# Patient Record
Sex: FEMALE | Race: WHITE | NOT HISPANIC OR LATINO | Employment: OTHER | URBAN - METROPOLITAN AREA
[De-identification: names, ages, dates, MRNs, and addresses within clinical notes are randomized per-mention and may not be internally consistent; named-entity substitution may affect disease eponyms.]

---

## 2019-02-25 ENCOUNTER — OFFICE VISIT (OUTPATIENT)
Dept: ORTHOPEDICS | Facility: CLINIC | Age: 74
End: 2019-02-25
Payer: MEDICARE

## 2019-02-25 VITALS
HEIGHT: 59 IN | SYSTOLIC BLOOD PRESSURE: 130 MMHG | HEART RATE: 79 BPM | BODY MASS INDEX: 27.21 KG/M2 | WEIGHT: 135 LBS | DIASTOLIC BLOOD PRESSURE: 80 MMHG

## 2019-02-25 DIAGNOSIS — Z98.1 HISTORY OF FUSION OF CERVICAL SPINE: ICD-10-CM

## 2019-02-25 DIAGNOSIS — S16.1XXA ACUTE STRAIN OF NECK MUSCLE, INITIAL ENCOUNTER: ICD-10-CM

## 2019-02-25 DIAGNOSIS — M46.02 SPINAL ENTHESOPATHY OF CERVICAL REGION: ICD-10-CM

## 2019-02-25 DIAGNOSIS — M54.2 CERVICAL PAIN (NECK): Primary | ICD-10-CM

## 2019-02-25 PROCEDURE — 99213 PR OFFICE/OUTPT VISIT, EST, LEVL III, 20-29 MIN: ICD-10-PCS | Mod: 25,,, | Performed by: ORTHOPAEDIC SURGERY

## 2019-02-25 PROCEDURE — 72040 X-RAY EXAM NECK SPINE 2-3 VW: CPT | Mod: ,,, | Performed by: ORTHOPAEDIC SURGERY

## 2019-02-25 PROCEDURE — 20551 TENDON ORIGIN: ICD-10-PCS | Mod: 59,RT,, | Performed by: ORTHOPAEDIC SURGERY

## 2019-02-25 PROCEDURE — 20551 NJX 1 TENDON ORIGIN/INSJ: CPT | Mod: RT,,, | Performed by: ORTHOPAEDIC SURGERY

## 2019-02-25 PROCEDURE — 99213 OFFICE O/P EST LOW 20 MIN: CPT | Mod: 25,,, | Performed by: ORTHOPAEDIC SURGERY

## 2019-02-25 PROCEDURE — 72040: ICD-10-PCS | Mod: ,,, | Performed by: ORTHOPAEDIC SURGERY

## 2019-02-25 RX ORDER — HYDROCHLOROTHIAZIDE 12.5 MG/1
TABLET ORAL
COMMUNITY
End: 2019-02-25

## 2019-02-25 RX ORDER — LOSARTAN POTASSIUM 25 MG/1
TABLET ORAL
COMMUNITY
Start: 2019-01-30

## 2019-02-25 RX ORDER — SERTRALINE HYDROCHLORIDE 100 MG/1
TABLET, FILM COATED ORAL
COMMUNITY
Start: 2019-01-30 | End: 2020-10-12 | Stop reason: ALTCHOICE

## 2019-02-25 RX ORDER — TRAZODONE HYDROCHLORIDE 50 MG/1
TABLET ORAL
Status: ON HOLD | COMMUNITY
Start: 2019-01-30 | End: 2020-12-04

## 2019-02-25 RX ORDER — BLOOD-GLUCOSE METER
KIT MISCELLANEOUS
COMMUNITY
Start: 2018-11-27

## 2019-02-25 RX ORDER — OMEPRAZOLE 20 MG/1
CAPSULE, DELAYED RELEASE ORAL
COMMUNITY
Start: 2019-01-30 | End: 2021-07-30

## 2019-02-25 RX ORDER — MELOXICAM 15 MG/1
15 TABLET ORAL DAILY
Qty: 30 TABLET | Refills: 2 | Status: SHIPPED | OUTPATIENT
Start: 2019-02-25 | End: 2019-03-27

## 2019-02-25 RX ORDER — METHYLPREDNISOLONE ACETATE 40 MG/ML
40 INJECTION, SUSPENSION INTRA-ARTICULAR; INTRALESIONAL; INTRAMUSCULAR; SOFT TISSUE
Status: DISCONTINUED | OUTPATIENT
Start: 2019-02-25 | End: 2019-02-25 | Stop reason: HOSPADM

## 2019-02-25 RX ORDER — ATORVASTATIN CALCIUM 80 MG/1
TABLET, FILM COATED ORAL
COMMUNITY
Start: 2018-11-27

## 2019-02-25 RX ORDER — LANOLIN ALCOHOL/MO/W.PET/CERES
CREAM (GRAM) TOPICAL
COMMUNITY
Start: 2019-01-30

## 2019-02-25 RX ORDER — SITAGLIPTIN AND METFORMIN HYDROCHLORIDE 1000; 50 MG/1; MG/1
TABLET, FILM COATED, EXTENDED RELEASE ORAL
COMMUNITY
Start: 2019-01-30 | End: 2020-10-12 | Stop reason: ALTCHOICE

## 2019-02-25 RX ORDER — TIZANIDINE 4 MG/1
4 TABLET ORAL 3 TIMES DAILY
Qty: 90 TABLET | Refills: 1 | Status: SHIPPED | OUTPATIENT
Start: 2019-02-25 | End: 2019-03-27

## 2019-02-25 RX ORDER — CHOLECALCIFEROL (VITAMIN D3) 50 MCG
TABLET ORAL
COMMUNITY
Start: 2019-01-30

## 2019-02-25 RX ORDER — GLIPIZIDE 2.5 MG/1
TABLET, EXTENDED RELEASE ORAL
COMMUNITY
Start: 2019-01-30 | End: 2020-10-12 | Stop reason: ALTCHOICE

## 2019-02-25 RX ADMIN — METHYLPREDNISOLONE ACETATE 40 MG: 40 INJECTION, SUSPENSION INTRA-ARTICULAR; INTRALESIONAL; INTRAMUSCULAR; SOFT TISSUE at 11:02

## 2019-02-25 NOTE — PROCEDURES
[facility]  Date/Time: 2/25/2019 11:15 AM  Performed by: Wilver Diaz MD  Authorized by: Wilver Diaz MD     Consent Done?:  Yes (Verbal)  Timeout: prior to procedure the correct patient, procedure, and site was verified    Indications:  Pain  Timeout: prior to procedure the correct patient, procedure, and site was verified    Location: RT sided cervical.  Needle size:  25 G  Medications:  40 mg methylPREDNISolone acetate 40 mg/mL  Patient tolerance:  Patient tolerated the procedure well with no immediate complications  [facility]  Date/Time: 2/25/2019 11:16 AM  Performed by: Wilver Diaz MD  Authorized by: Wilver Diaz MD     Consent Done?:  Yes (Verbal)  Timeout: prior to procedure the correct patient, procedure, and site was verified    Indications:  Pain  Timeout: prior to procedure the correct patient, procedure, and site was verified    Location: RT sided scapular.  Needle size:  25 G  Medications:  40 mg methylPREDNISolone acetate 40 mg/mL  Patient tolerance:  Patient tolerated the procedure well with no immediate complications

## 2019-02-25 NOTE — PROGRESS NOTES
Subjective:       Patient ID: Madhuri Manuel is a 73 y.o. female.    Chief Complaint: Pain of the Neck (Neck pain x 2 months radiates to right shoulder and elbow. No injury)      History of Present Illness  Patient is here secondary to reoccurrence of right-sided neck pain. In radiates down her right upper extremity to about the elbow. She is status post a previous multilevel cervical fusion from C3-C7 which was done in general 2017. She did extremely well postoperatively until this recent onset of symptoms about 2 months ago. There was no true acute injury but there was an incidence where her head got pushed back somewhat; she feels like this was the onset of her symptoms.    Current Medications  Current Outpatient Medications   Medication Sig Dispense Refill    atorvastatin (LIPITOR) 80 MG tablet       CALCITRATE-VITAMIN D 315-250 mg-unit Tab       cholecalciferol, vitamin D3, (VITAMIN D3) 2,000 unit Tab       FREESTYLE LITE STRIPS Strp       glipiZIDE (GLUCOTROL) 2.5 MG TR24       JANUMET XR 50-1,000 mg TM24       losartan (COZAAR) 25 MG tablet       magnesium oxide (MAG-OX) 400 mg (241.3 mg magnesium) tablet       omeprazole (PRILOSEC) 20 MG capsule       sertraline (ZOLOFT) 100 MG tablet       traZODone (DESYREL) 50 MG tablet        No current facility-administered medications for this visit.        Allergies  Review of patient's allergies indicates:   Allergen Reactions    Penicillins Rash     started after taking 8-10 days of augmentin. She did have a reclast injection in the last 24 hours also but has never had problems with this.      Iodinated contrast- oral and iv dye     Latex Rash       Past Medical History  Past Medical History:   Diagnosis Date    Anemia     Diabetes mellitus, type 2     Hypertension     Osteoporosis        Surgical History  Past Surgical History:   Procedure Laterality Date    CERVICAL FUSION      gastric by pass      kidney stones         Family History:    History reviewed. No pertinent family history.    Social History:   Social History     Socioeconomic History    Marital status:      Spouse name: Not on file    Number of children: Not on file    Years of education: Not on file    Highest education level: Not on file   Social Needs    Financial resource strain: Not on file    Food insecurity - worry: Not on file    Food insecurity - inability: Not on file    Transportation needs - medical: Not on file    Transportation needs - non-medical: Not on file   Occupational History    Not on file   Tobacco Use    Smoking status: Never Smoker    Smokeless tobacco: Never Used   Substance and Sexual Activity    Alcohol use: Not on file    Drug use: Not on file    Sexual activity: Not on file   Other Topics Concern    Not on file   Social History Narrative    Not on file       Hospitalization/Major Diagnostic Procedure:     Review of Systems     General/Constitutional:  Chills denies. Fatigue denies. Fever denies. Weight gain denies. Weight loss denies.    Respiratory:  Shortness of breath denies.    Cardiovascular:  Chest pain denies.    Gastrointestinal:  Constipation denies. Diarrhea denies. Nausea denies. Vomiting denies.     Hematology:  Easy bruising denies. Prolonged bleeding denies.     Genitourinary:  Frequent urination denies. Pain in lower back denies. Painful urination denies.     Musculoskeletal:  See HPI for details    Skin:  Rash denies.    Neurologic:  Dizziness denies. Gait abnormalities denies. Seizures denies. Tingling/Numbess denies.    Psychiatric:  Anxiety denies. Depressed mood denies.     Objective:   Vital Signs:   Vitals:    02/25/19 1036   BP: 130/80   Pulse: 79        Physical Exam      General Examination:     Constitutional: The patient is alert and oriented to lace person and time. Mood is pleasant.     Head/Face: Normal facial features normal eyebrows    Eyes: Normal extraocular motion bilaterally    Lungs: Respirations  "are equal and unlabored    Gait is coordinated.    Cardiovascular: There are no swelling or varicosities present.    Lymphatic: Negative for adenopathy    Skin: Normal    Neurological: Level of consciousness normal. Oriented to place person and time and situation    Psychiatric: Oriented to time place person and situation    Cervical exam: Tenderness to palpation with muscle spasm and trigger point of the right sternocleidomastoid and upper trapezius. Range of motion limited secondary to pain and guarding. Bilateral upper extremities additional neurovascular intact. Some generalized deconditioning but no persistent true focal neurological weakness. Anterior incision remains well-healed.    XRAY Report/ Interpretation: Cervical AP and lateral x-rays were taken the office today reviewed the patient and demonstrated excellent interbody fusion at all 3 levels with appropriate anterior plate fixation. Mild degenerative change above her fusion at C3-4.      Assessment:       1. Cervical pain (neck)    2. History of fusion of cervical spine    3. Acute strain of neck muscle, initial encounter    4. Spinal enthesopathy of cervical region        Plan:       Madhuri was seen today for pain.    Diagnoses and all orders for this visit:    Cervical pain (neck)  -     X-Ray Cervical Spine AP And Lateral    History of fusion of cervical spine    Acute strain of neck muscle, initial encounter    Spinal enthesopathy of cervical region         No Follow-up on file.  Sukh Moscoso, physician's assistant served in the capacity as a "scribe" for this patient encounter  A "face to face" encounter occurred with Dr. Diaz on this date  The treatment plan and medical decision making is outlined below:  Today she was given a right sternocleidomastoid and upper trapezius trigger point injections each with 1 cc lidocaine and 40 mrem Depo-Medrol. She was given prescriptions for meloxicam and Zanaflex. I instructed the patient to call me in " 2 weeks if her symptoms have not improved and that we would order an updated cervical MRI. She has had PT in the past and it has not helped with her symptoms.  Also she would benefit from a rolling walker with seat    This note was created using Dragon voice recognition software that occasionally misinterpreted phrases or words.

## 2019-05-24 ENCOUNTER — OFFICE VISIT (OUTPATIENT)
Dept: ORTHOPEDICS | Facility: CLINIC | Age: 74
End: 2019-05-24
Payer: MEDICARE

## 2019-05-24 VITALS
SYSTOLIC BLOOD PRESSURE: 118 MMHG | DIASTOLIC BLOOD PRESSURE: 60 MMHG | WEIGHT: 132 LBS | BODY MASS INDEX: 26.61 KG/M2 | HEIGHT: 59 IN | HEART RATE: 73 BPM

## 2019-05-24 DIAGNOSIS — Z98.1 HISTORY OF FUSION OF CERVICAL SPINE: ICD-10-CM

## 2019-05-24 DIAGNOSIS — M54.2 CERVICAL PAIN (NECK): Primary | ICD-10-CM

## 2019-05-24 DIAGNOSIS — M79.672 FOOT PAIN, LEFT: ICD-10-CM

## 2019-05-24 PROCEDURE — 73630 PR  X-RAY FOOT 3+ VW: ICD-10-PCS | Mod: 26,LT,, | Performed by: PHYSICIAN ASSISTANT

## 2019-05-24 PROCEDURE — 73630 PR  X-RAY FOOT 3+ VW: ICD-10-PCS | Mod: TC,LT,, | Performed by: ORTHOPAEDIC SURGERY

## 2019-05-24 PROCEDURE — 99213 PR OFFICE/OUTPT VISIT, EST, LEVL III, 20-29 MIN: ICD-10-PCS | Mod: 25,,, | Performed by: PHYSICIAN ASSISTANT

## 2019-05-24 PROCEDURE — 73630 X-RAY EXAM OF FOOT: CPT | Mod: 26,LT,, | Performed by: PHYSICIAN ASSISTANT

## 2019-05-24 PROCEDURE — 73630 X-RAY EXAM OF FOOT: CPT | Mod: TC,LT,, | Performed by: ORTHOPAEDIC SURGERY

## 2019-05-24 PROCEDURE — 28470 PR CLOSED RX METATARSAL FX: ICD-10-PCS | Mod: LT,,, | Performed by: PHYSICIAN ASSISTANT

## 2019-05-24 PROCEDURE — 99213 OFFICE O/P EST LOW 20 MIN: CPT | Mod: 25,,, | Performed by: PHYSICIAN ASSISTANT

## 2019-05-24 PROCEDURE — 28470 CLTX METATARSAL FX WO MNP EA: CPT | Mod: LT,,, | Performed by: PHYSICIAN ASSISTANT

## 2019-05-24 RX ORDER — MELOXICAM 15 MG/1
15 TABLET ORAL DAILY
COMMUNITY
Start: 2019-02-25 | End: 2019-07-08

## 2019-05-24 RX ORDER — TIZANIDINE 4 MG/1
4 TABLET ORAL EVERY 8 HOURS
COMMUNITY
End: 2019-07-08

## 2019-05-24 NOTE — PROGRESS NOTES
Subjective:       Patient ID: Madhuri Manuel is a 73 y.o. female.    Chief Complaint: Pain of the Neck (cervical pain is worse; c/o stiffness and spasms; pain down right arm;  No treatment since last visit)      History of Present Illness  Patient is here secondary to reoccurrence of right-sided neck pain. In radiates down her right upper extremity to about the elbow. She is status post a previous multilevel cervical fusion from C3-C7 which was done in January 2017. She did extremely well postoperatively until this recent onset of symptoms about 3 months ago. There was no true acute injury but there was an incidence where her head got pushed back somewhat; she feels like this was the onset of her symptoms. She was last here about 3 months ago. She is also complaining of some left foot lateral pain that started about a week ago    Current Medications  Current Outpatient Medications   Medication Sig Dispense Refill    atorvastatin (LIPITOR) 80 MG tablet       CALCITRATE-VITAMIN D 315-250 mg-unit Tab       cholecalciferol, vitamin D3, (VITAMIN D3) 2,000 unit Tab       FREESTYLE LITE STRIPS Strp       glipiZIDE (GLUCOTROL) 2.5 MG TR24       JANUMET XR 50-1,000 mg TM24       losartan (COZAAR) 25 MG tablet       magnesium oxide (MAG-OX) 400 mg (241.3 mg magnesium) tablet       meloxicam (MOBIC) 15 MG tablet Take 15 mg by mouth once daily.      omeprazole (PRILOSEC) 20 MG capsule       sertraline (ZOLOFT) 100 MG tablet       tiZANidine (ZANAFLEX) 4 MG tablet Take 4 mg by mouth every 8 (eight) hours.      traZODone (DESYREL) 50 MG tablet        No current facility-administered medications for this visit.        Allergies  Review of patient's allergies indicates:   Allergen Reactions    Penicillins Rash     started after taking 8-10 days of augmentin. She did have a reclast injection in the last 24 hours also but has never had problems with this.      Iodinated contrast- oral and iv dye     Latex Rash        Past Medical History  Past Medical History:   Diagnosis Date    Anemia     Diabetes mellitus, type 2     Hypertension     Osteoporosis        Surgical History  Past Surgical History:   Procedure Laterality Date    CERVICAL FUSION      gastric by pass      kidney stones         Family History:   History reviewed. No pertinent family history.    Social History:   Social History     Socioeconomic History    Marital status:      Spouse name: Not on file    Number of children: Not on file    Years of education: Not on file    Highest education level: Not on file   Occupational History    Not on file   Social Needs    Financial resource strain: Not on file    Food insecurity:     Worry: Not on file     Inability: Not on file    Transportation needs:     Medical: Not on file     Non-medical: Not on file   Tobacco Use    Smoking status: Never Smoker    Smokeless tobacco: Never Used   Substance and Sexual Activity    Alcohol use: Not on file    Drug use: Not on file    Sexual activity: Not on file   Lifestyle    Physical activity:     Days per week: Not on file     Minutes per session: Not on file    Stress: Not on file   Relationships    Social connections:     Talks on phone: Not on file     Gets together: Not on file     Attends Restoration service: Not on file     Active member of club or organization: Not on file     Attends meetings of clubs or organizations: Not on file     Relationship status: Not on file   Other Topics Concern    Not on file   Social History Narrative    Not on file       Hospitalization/Major Diagnostic Procedure:     Review of Systems     General/Constitutional:  Chills denies. Fatigue denies. Fever denies. Weight gain denies. Weight loss denies.    Respiratory:  Shortness of breath denies.    Cardiovascular:  Chest pain denies.    Gastrointestinal:  Constipation denies. Diarrhea denies. Nausea denies. Vomiting denies.     Hematology:  Easy bruising denies.  Prolonged bleeding denies.     Genitourinary:  Frequent urination denies. Pain in lower back denies. Painful urination denies.     Musculoskeletal:  See HPI for details    Skin:  Rash denies.    Neurologic:  Dizziness denies. Gait abnormalities denies. Seizures denies. Tingling/Numbess denies.    Psychiatric:  Anxiety denies. Depressed mood denies.     Objective:   Vital Signs:   Vitals:    05/24/19 1120   BP: 118/60   Pulse: 73        Physical Exam      General Examination:     Constitutional: The patient is alert and oriented to lace person and time. Mood is pleasant.     Head/Face: Normal facial features normal eyebrows    Eyes: Normal extraocular motion bilaterally    Lungs: Respirations are equal and unlabored    Gait is coordinated.    Cardiovascular: There are no swelling or varicosities present.    Lymphatic: Negative for adenopathy    Skin: Normal    Neurological: Level of consciousness normal. Oriented to place person and time and situation    Psychiatric: Oriented to time place person and situation    Cervical exam: Tenderness to palpation with muscle spasm and trigger point of the right sternocleidomastoid and upper trapezius. Range of motion limited secondary to pain and guarding. Bilateral upper extremities additional neurovascular intact. Some generalized deconditioning but no persistent true focal neurological weakness. Anterior incision remains well-healed. Left foot exam demonstrates tenderness palpation over the head of the fifth metatarsal.    XRAY Report/ Interpretation: Left foot x-rays AP, lateral, and oblique taken in the office today reviewed the patient demonstrate a definite stress fracture of the fifth metatarsal      Assessment:       1. Cervical pain (neck)    2. History of fusion of cervical spine    3. Foot pain, left        Plan:       Madhuri was seen today for pain.    Diagnoses and all orders for this visit:    Cervical pain (neck)    History of fusion of cervical spine    Foot  pain, left         No follow-ups on file.    Regarding the cervical spine and recurrence of pain we deftly need an updated MRI of the cervical spine. This will be ordered and we will see her back in 3 weeks to review the MRI discuss further treatment options.    Regarding the left foot stress fracture of the fifth metatarsal she was given a hard sole shoe that she should wear whenever she is ambulating. Weightbearing as tolerated. Follow-up in 3 weeks to repeat x-rays.    This note was created using Dragon voice recognition software that occasionally misinterpreted phrases or words.

## 2019-06-12 ENCOUNTER — OFFICE VISIT (OUTPATIENT)
Dept: ORTHOPEDICS | Facility: CLINIC | Age: 74
End: 2019-06-12
Payer: MEDICARE

## 2019-06-12 ENCOUNTER — TELEPHONE (OUTPATIENT)
Dept: ORTHOPEDICS | Facility: CLINIC | Age: 74
End: 2019-06-12

## 2019-06-12 VITALS
DIASTOLIC BLOOD PRESSURE: 70 MMHG | HEIGHT: 59 IN | HEART RATE: 76 BPM | SYSTOLIC BLOOD PRESSURE: 128 MMHG | BODY MASS INDEX: 26.21 KG/M2 | WEIGHT: 130 LBS

## 2019-06-12 DIAGNOSIS — Z98.1 HISTORY OF FUSION OF CERVICAL SPINE: ICD-10-CM

## 2019-06-12 DIAGNOSIS — M50.31 OTHER CERVICAL DISC DEGENERATION, HIGH CERVICAL REGION: ICD-10-CM

## 2019-06-12 DIAGNOSIS — S92.356A: Primary | ICD-10-CM

## 2019-06-12 PROCEDURE — 99214 PR OFFICE/OUTPT VISIT, EST, LEVL IV, 30-39 MIN: ICD-10-PCS | Mod: 24,,, | Performed by: ORTHOPAEDIC SURGERY

## 2019-06-12 PROCEDURE — 73630 PR  X-RAY FOOT 3+ VW: ICD-10-PCS | Mod: LT,,, | Performed by: ORTHOPAEDIC SURGERY

## 2019-06-12 PROCEDURE — 73630 X-RAY EXAM OF FOOT: CPT | Mod: LT,,, | Performed by: ORTHOPAEDIC SURGERY

## 2019-06-12 PROCEDURE — 99214 OFFICE O/P EST MOD 30 MIN: CPT | Mod: 24,,, | Performed by: ORTHOPAEDIC SURGERY

## 2019-06-12 RX ORDER — ZOLEDRONIC ACID 5 MG/100ML
5 INJECTION, SOLUTION INTRAVENOUS
COMMUNITY

## 2019-06-12 RX ORDER — PYRIDOXINE HCL (VITAMIN B6) 50 MG
TABLET ORAL
COMMUNITY
Start: 2019-05-10

## 2019-06-12 RX ORDER — NAPROXEN SODIUM 220 MG/1
1 TABLET, FILM COATED ORAL
COMMUNITY
Start: 2019-05-10

## 2019-06-12 NOTE — PROGRESS NOTES
Subjective:       Patient ID: Madhuri Manuel is a 73 y.o. female.    Chief Complaint: Pain of the Neck (C spine pain/MRI results 5.28.19. States that her neck pain is about the same. ) and Pain of the Left Foot (Left foot pain/fx f/u. States that her foot pain is about the same no change. )      History of Present Illness  Patient is here to follow-up for her neck and her left foot. Overall symptoms are unchanged compared where they were 3 weeks ago. She has an updated cervical MRI to review. The hard sole shoe/boot that she was given did not fit properly.  Cervical symptoms are described as a reoccurrence of right-sided neck pain. In radiates down her right upper extremity to about the elbow. She is status post a previous multilevel cervical fusion from C3-C7 which was done in January 2017.    Unfortunately Ms. Dhaliwal tells me today that her  passed away last week    Current Medications  Current Outpatient Medications   Medication Sig Dispense Refill    aspirin 81 MG Chew Take 1 tablet by mouth.      atorvastatin (LIPITOR) 80 MG tablet       CALCITRATE-VITAMIN D 315-250 mg-unit Tab       carboxymethylcell-glycerin,PF, (REFRESH OPTIVE SENSITIVE, PF,) 0.5-0.9 % Dpet       cholecalciferol, vitamin D3, (VITAMIN D3) 2,000 unit Tab       FREESTYLE LITE STRIPS Strp       glipiZIDE (GLUCOTROL) 2.5 MG TR24       JANUMET XR 50-1,000 mg TM24       losartan (COZAAR) 25 MG tablet       magnesium oxide (MAG-OX) 400 mg (241.3 mg magnesium) tablet       meloxicam (MOBIC) 15 MG tablet Take 15 mg by mouth once daily.      omeprazole (PRILOSEC) 20 MG capsule       sertraline (ZOLOFT) 100 MG tablet       tiZANidine (ZANAFLEX) 4 MG tablet Take 4 mg by mouth every 8 (eight) hours.      traZODone (DESYREL) 50 MG tablet       VITAMIN B-6 50 MG tablet       zoledronic acid-mannitol & water (RECLAST) 5 mg/100 mL PgBk Inject 5 mg into the vein.       No current facility-administered medications for this visit.         Allergies  Review of patient's allergies indicates:   Allergen Reactions    Penicillins Rash     started after taking 8-10 days of augmentin. She did have a reclast injection in the last 24 hours also but has never had problems with this.      Iodinated contrast- oral and iv dye     Latex Rash       Past Medical History  Past Medical History:   Diagnosis Date    Anemia     Diabetes mellitus, type 2     Hypertension     Osteoporosis        Surgical History  Past Surgical History:   Procedure Laterality Date    CERVICAL FUSION      gastric by pass      kidney stones         Family History:   History reviewed. No pertinent family history.    Social History:   Social History     Socioeconomic History    Marital status:      Spouse name: Not on file    Number of children: Not on file    Years of education: Not on file    Highest education level: Not on file   Occupational History    Not on file   Social Needs    Financial resource strain: Not on file    Food insecurity:     Worry: Not on file     Inability: Not on file    Transportation needs:     Medical: Not on file     Non-medical: Not on file   Tobacco Use    Smoking status: Never Smoker    Smokeless tobacco: Never Used   Substance and Sexual Activity    Alcohol use: Not on file    Drug use: Not on file    Sexual activity: Not on file   Lifestyle    Physical activity:     Days per week: Not on file     Minutes per session: Not on file    Stress: Not on file   Relationships    Social connections:     Talks on phone: Not on file     Gets together: Not on file     Attends Rastafarian service: Not on file     Active member of club or organization: Not on file     Attends meetings of clubs or organizations: Not on file     Relationship status: Not on file   Other Topics Concern    Not on file   Social History Narrative    Not on file       Hospitalization/Major Diagnostic Procedure:     Review of Systems     General/Constitutional:   Chills denies. Fatigue denies. Fever denies. Weight gain denies. Weight loss denies.    Respiratory:  Shortness of breath denies.    Cardiovascular:  Chest pain denies.    Gastrointestinal:  Constipation denies. Diarrhea denies. Nausea denies. Vomiting denies.     Hematology:  Easy bruising denies. Prolonged bleeding denies.     Genitourinary:  Frequent urination denies. Pain in lower back denies. Painful urination denies.     Musculoskeletal:  See HPI for details    Skin:  Rash denies.    Neurologic:  Dizziness denies. Gait abnormalities denies. Seizures denies. Tingling/Numbess denies.    Psychiatric:  Anxiety denies. Depressed mood denies.     Objective:   Vital Signs:   Vitals:    06/12/19 0859   BP: 128/70   Pulse: 76        Physical Exam      General Examination:     Constitutional: The patient is alert and oriented to lace person and time. Mood is pleasant.     Head/Face: Normal facial features normal eyebrows    Eyes: Normal extraocular motion bilaterally    Lungs: Respirations are equal and unlabored    Gait is coordinated.    Cardiovascular: There are no swelling or varicosities present.    Lymphatic: Negative for adenopathy    Skin: Normal    Neurological: Level of consciousness normal. Oriented to place person and time and situation    Psychiatric: Oriented to time place person and situation    Cervical exam: Tenderness to palpation with muscle spasm and trigger point of the right sternocleidomastoid and upper trapezius. Range of motion limited secondary to pain and guarding. Bilateral upper extremities additional neurovascular intact. Some generalized deconditioning but no persistent true focal neurological weakness. Anterior incision remains well-healed. Left foot exam demonstrates tenderness palpation over the head of the fifth metatarsal.    XRAY Report/ Interpretation: Cervical MRI reviewed the patient in the office today demonstrates previous stable fusion from see for through C7. There is deftly  "some adjacent level breakdown at C3-4 with disc herniation right side predominant causing foraminal stenosis.    AP lateral and oblique views of the left foot taken in the office today were reviewed and demonstrate worsening of the stress fracture of the fifth metatarsal.      Assessment:       1. Closed nondisplaced fracture of fifth metatarsal bone    2. History of fusion of cervical spine    3. Other cervical disc degeneration, high cervical region        Plan:       Madhuri was seen today for pain and pain.    Diagnoses and all orders for this visit:    Closed nondisplaced fracture of fifth metatarsal bone  -     X-Ray Foot Complete Left    History of fusion of cervical spine    Other cervical disc degeneration, high cervical region         No follow-ups on file.  Sukh Moscoso, physician's assistant served in the capacity as a "scribe" for this patient encounter  A "face to face" encounter occurred with Dr. Diaz on this date  The treatment plan and medical decision making is outlined below:  She will be referred to Dr. Polk for right C3 and C4 nerve root transforaminal epidural steroid injections. She has been instructed to remain nonweightbearing on the left lower extremity for 6 weeks. She was also given a fracture walker boot to help stabilize her foot. I need to see her back in 3 weeks to repeat her x-rays. We should also get some updated cervical x-rays at that time.    This note was created using Dragon voice recognition software that occasionally misinterpreted phrases or words.  "

## 2019-06-12 NOTE — H&P (VIEW-ONLY)
Subjective:       Patient ID: Madhuri Manuel is a 73 y.o. female.    Chief Complaint: Pain of the Neck (C spine pain/MRI results 5.28.19. States that her neck pain is about the same. ) and Pain of the Left Foot (Left foot pain/fx f/u. States that her foot pain is about the same no change. )      History of Present Illness  Patient is here to follow-up for her neck and her left foot. Overall symptoms are unchanged compared where they were 3 weeks ago. She has an updated cervical MRI to review. The hard sole shoe/boot that she was given did not fit properly.  Cervical symptoms are described as a reoccurrence of right-sided neck pain. In radiates down her right upper extremity to about the elbow. She is status post a previous multilevel cervical fusion from C3-C7 which was done in January 2017.    Unfortunately Ms. Dhaliwal tells me today that her  passed away last week    Current Medications  Current Outpatient Medications   Medication Sig Dispense Refill    aspirin 81 MG Chew Take 1 tablet by mouth.      atorvastatin (LIPITOR) 80 MG tablet       CALCITRATE-VITAMIN D 315-250 mg-unit Tab       carboxymethylcell-glycerin,PF, (REFRESH OPTIVE SENSITIVE, PF,) 0.5-0.9 % Dpet       cholecalciferol, vitamin D3, (VITAMIN D3) 2,000 unit Tab       FREESTYLE LITE STRIPS Strp       glipiZIDE (GLUCOTROL) 2.5 MG TR24       JANUMET XR 50-1,000 mg TM24       losartan (COZAAR) 25 MG tablet       magnesium oxide (MAG-OX) 400 mg (241.3 mg magnesium) tablet       meloxicam (MOBIC) 15 MG tablet Take 15 mg by mouth once daily.      omeprazole (PRILOSEC) 20 MG capsule       sertraline (ZOLOFT) 100 MG tablet       tiZANidine (ZANAFLEX) 4 MG tablet Take 4 mg by mouth every 8 (eight) hours.      traZODone (DESYREL) 50 MG tablet       VITAMIN B-6 50 MG tablet       zoledronic acid-mannitol & water (RECLAST) 5 mg/100 mL PgBk Inject 5 mg into the vein.       No current facility-administered medications for this visit.         Allergies  Review of patient's allergies indicates:   Allergen Reactions    Penicillins Rash     started after taking 8-10 days of augmentin. She did have a reclast injection in the last 24 hours also but has never had problems with this.      Iodinated contrast- oral and iv dye     Latex Rash       Past Medical History  Past Medical History:   Diagnosis Date    Anemia     Diabetes mellitus, type 2     Hypertension     Osteoporosis        Surgical History  Past Surgical History:   Procedure Laterality Date    CERVICAL FUSION      gastric by pass      kidney stones         Family History:   History reviewed. No pertinent family history.    Social History:   Social History     Socioeconomic History    Marital status:      Spouse name: Not on file    Number of children: Not on file    Years of education: Not on file    Highest education level: Not on file   Occupational History    Not on file   Social Needs    Financial resource strain: Not on file    Food insecurity:     Worry: Not on file     Inability: Not on file    Transportation needs:     Medical: Not on file     Non-medical: Not on file   Tobacco Use    Smoking status: Never Smoker    Smokeless tobacco: Never Used   Substance and Sexual Activity    Alcohol use: Not on file    Drug use: Not on file    Sexual activity: Not on file   Lifestyle    Physical activity:     Days per week: Not on file     Minutes per session: Not on file    Stress: Not on file   Relationships    Social connections:     Talks on phone: Not on file     Gets together: Not on file     Attends Church service: Not on file     Active member of club or organization: Not on file     Attends meetings of clubs or organizations: Not on file     Relationship status: Not on file   Other Topics Concern    Not on file   Social History Narrative    Not on file       Hospitalization/Major Diagnostic Procedure:     Review of Systems     General/Constitutional:   Chills denies. Fatigue denies. Fever denies. Weight gain denies. Weight loss denies.    Respiratory:  Shortness of breath denies.    Cardiovascular:  Chest pain denies.    Gastrointestinal:  Constipation denies. Diarrhea denies. Nausea denies. Vomiting denies.     Hematology:  Easy bruising denies. Prolonged bleeding denies.     Genitourinary:  Frequent urination denies. Pain in lower back denies. Painful urination denies.     Musculoskeletal:  See HPI for details    Skin:  Rash denies.    Neurologic:  Dizziness denies. Gait abnormalities denies. Seizures denies. Tingling/Numbess denies.    Psychiatric:  Anxiety denies. Depressed mood denies.     Objective:   Vital Signs:   Vitals:    06/12/19 0859   BP: 128/70   Pulse: 76        Physical Exam      General Examination:     Constitutional: The patient is alert and oriented to lace person and time. Mood is pleasant.     Head/Face: Normal facial features normal eyebrows    Eyes: Normal extraocular motion bilaterally    Lungs: Respirations are equal and unlabored    Gait is coordinated.    Cardiovascular: There are no swelling or varicosities present.    Lymphatic: Negative for adenopathy    Skin: Normal    Neurological: Level of consciousness normal. Oriented to place person and time and situation    Psychiatric: Oriented to time place person and situation    Cervical exam: Tenderness to palpation with muscle spasm and trigger point of the right sternocleidomastoid and upper trapezius. Range of motion limited secondary to pain and guarding. Bilateral upper extremities additional neurovascular intact. Some generalized deconditioning but no persistent true focal neurological weakness. Anterior incision remains well-healed. Left foot exam demonstrates tenderness palpation over the head of the fifth metatarsal.    XRAY Report/ Interpretation: Cervical MRI reviewed the patient in the office today demonstrates previous stable fusion from see for through C7. There is deftly  "some adjacent level breakdown at C3-4 with disc herniation right side predominant causing foraminal stenosis.    AP lateral and oblique views of the left foot taken in the office today were reviewed and demonstrate worsening of the stress fracture of the fifth metatarsal.      Assessment:       1. Closed nondisplaced fracture of fifth metatarsal bone    2. History of fusion of cervical spine    3. Other cervical disc degeneration, high cervical region        Plan:       Madhuri was seen today for pain and pain.    Diagnoses and all orders for this visit:    Closed nondisplaced fracture of fifth metatarsal bone  -     X-Ray Foot Complete Left    History of fusion of cervical spine    Other cervical disc degeneration, high cervical region         No follow-ups on file.  Sukh Moscoso, physician's assistant served in the capacity as a "scribe" for this patient encounter  A "face to face" encounter occurred with Dr. Diaz on this date  The treatment plan and medical decision making is outlined below:  She will be referred to Dr. Polk for right C3 and C4 nerve root transforaminal epidural steroid injections. She has been instructed to remain nonweightbearing on the left lower extremity for 6 weeks. She was also given a fracture walker boot to help stabilize her foot. I need to see her back in 3 weeks to repeat her x-rays. We should also get some updated cervical x-rays at that time.    This note was created using Dragon voice recognition software that occasionally misinterpreted phrases or words.  "

## 2019-06-18 DIAGNOSIS — M54.12 CERVICAL RADICULOPATHY: Primary | ICD-10-CM

## 2019-07-03 ENCOUNTER — HOSPITAL ENCOUNTER (OUTPATIENT)
Facility: AMBULARY SURGERY CENTER | Age: 74
Discharge: HOME | End: 2019-07-03
Attending: ANESTHESIOLOGY | Admitting: ANESTHESIOLOGY
Payer: MEDICARE

## 2019-07-03 DIAGNOSIS — M54.12 CERVICAL RADICULITIS: Primary | ICD-10-CM

## 2019-07-03 LAB — POCT GLUCOSE: 127 MG/DL (ref 70–110)

## 2019-07-03 PROCEDURE — 64479 NJX AA&/STRD TFRM EPI C/T 1: CPT | Performed by: ANESTHESIOLOGY

## 2019-07-03 PROCEDURE — 64479 NJX AA&/STRD TFRM EPI C/T 1: CPT | Mod: RT,,, | Performed by: ANESTHESIOLOGY

## 2019-07-03 PROCEDURE — 64479 PR INJECT ANES/STEROID FORAMEN CERV/THORACIC W IMG GUIDE ,1 LEVEL: ICD-10-PCS | Mod: RT,,, | Performed by: ANESTHESIOLOGY

## 2019-07-03 PROCEDURE — 99152 MOD SED SAME PHYS/QHP 5/>YRS: CPT | Mod: ,,, | Performed by: ANESTHESIOLOGY

## 2019-07-03 PROCEDURE — 99152 PR MOD CONSCIOUS SEDATION, SAME PHYS, 5+ YRS, FIRST 15 MIN: ICD-10-PCS | Mod: ,,, | Performed by: ANESTHESIOLOGY

## 2019-07-03 RX ORDER — MIDAZOLAM HYDROCHLORIDE 1 MG/ML
INJECTION INTRAMUSCULAR; INTRAVENOUS
Status: DISCONTINUED | OUTPATIENT
Start: 2019-07-03 | End: 2019-07-03 | Stop reason: HOSPADM

## 2019-07-03 RX ORDER — FENTANYL CITRATE 50 UG/ML
INJECTION, SOLUTION INTRAMUSCULAR; INTRAVENOUS
Status: DISCONTINUED | OUTPATIENT
Start: 2019-07-03 | End: 2019-07-03 | Stop reason: HOSPADM

## 2019-07-03 RX ORDER — LIDOCAINE HYDROCHLORIDE 10 MG/ML
INJECTION, SOLUTION EPIDURAL; INFILTRATION; INTRACAUDAL; PERINEURAL
Status: DISCONTINUED | OUTPATIENT
Start: 2019-07-03 | End: 2019-07-03 | Stop reason: HOSPADM

## 2019-07-03 RX ORDER — LIDOCAINE HYDROCHLORIDE AND EPINEPHRINE 10; 10 MG/ML; UG/ML
INJECTION, SOLUTION INFILTRATION; PERINEURAL
Status: DISCONTINUED
Start: 2019-07-03 | End: 2019-07-03 | Stop reason: HOSPADM

## 2019-07-03 RX ORDER — DEXAMETHASONE SODIUM PHOSPHATE 10 MG/ML
INJECTION INTRAMUSCULAR; INTRAVENOUS
Status: DISCONTINUED | OUTPATIENT
Start: 2019-07-03 | End: 2019-07-03 | Stop reason: HOSPADM

## 2019-07-03 RX ORDER — SODIUM CHLORIDE, SODIUM LACTATE, POTASSIUM CHLORIDE, CALCIUM CHLORIDE 600; 310; 30; 20 MG/100ML; MG/100ML; MG/100ML; MG/100ML
INJECTION, SOLUTION INTRAVENOUS CONTINUOUS
Status: DISCONTINUED | OUTPATIENT
Start: 2019-07-03 | End: 2019-07-03 | Stop reason: HOSPADM

## 2019-07-03 RX ORDER — LIDOCAINE HYDROCHLORIDE AND EPINEPHRINE 10; 10 MG/ML; UG/ML
INJECTION, SOLUTION INFILTRATION; PERINEURAL
Status: DISCONTINUED | OUTPATIENT
Start: 2019-07-03 | End: 2019-07-03 | Stop reason: HOSPADM

## 2019-07-03 RX ADMIN — SODIUM CHLORIDE, SODIUM LACTATE, POTASSIUM CHLORIDE, CALCIUM CHLORIDE: 600; 310; 30; 20 INJECTION, SOLUTION INTRAVENOUS at 01:07

## 2019-07-03 NOTE — OP NOTE
PROCEDURE DATE: 7/3/2019    PROCEDURE: Right C3-4 transforaminal epidural steroid injection under fluoroscopy     DIAGNOSIS: cervical disc displacement, cervical radiculopathy     Post op diagnosis: Same     PHYSICIAN: Vasquez Polk MD     MEDICATIONS INJECTED: Decadron 10mg and 1ml 1% lidocaine at each nerve root.     LOCAL ANESTHETIC INJECTED: Lidocaine 1%. 1 ml per site.     SEDATION MEDICATIONS: RN IV sedation    ESTIMATED BLOOD LOSS: none     COMPLICATIONS: none     TECHNIQUE: A time-out was taken to identify patient and procedure side prior to starting the procedure. The patient was placed in a prone position, prepped and draped in the usual sterile fashion using ChloraPrep and sterile towels. The area to be injected was determined under fluoroscopic guidance in AP and oblique view. Local anesthetic was given by raising a wheal and going down to the hub of a 25-gauge 1.5 inch needle. In oblique view, a 2inch 25-gauge bent-tip spinal needle was introduced towards posterior inferior portion of the right C3-4 foramen using the oblique view. C-arm was rotated about 45 degrees off vertical and 10-20 degrees cephalad to caudal. The needle was advanced in AP until tip of needle was past the lateral margins. 0.5ml of non-iodine contrast dye was injected to confirm appropriate placement and that there was no vascular uptake.  Test dose of 1ml  1% lidocaine with epinephrine was given.  There was no changes in vital signs.  After negative aspiration for blood or CSF, the medication was then injected. This was performed at the right C3-4level(s). The patient tolerated the procedure well.     The patient was monitored after the procedure. Patient was given post procedure and discharge instructions to follow at home. The patient was discharged in a stable condition.

## 2019-07-03 NOTE — DISCHARGE SUMMARY
[facility]  Discharge Note  Short Stay    Admit Date: 7/3/2019    Discharge Date and Time: 7/3/2019    Attending Physician: Vasquez Polk MD     Discharge Provider: Vasquez Polk    Diagnoses:  Active Hospital Problems    Diagnosis  POA    *Cervical radiculitis [M54.12]  Yes      Resolved Hospital Problems   No resolved problems to display.       Hospital Course: Cervical PATRICK  Discharged Condition: Good    Final Diagnoses:   Active Hospital Problems    Diagnosis  POA    *Cervical radiculitis [M54.12]  Yes      Resolved Hospital Problems   No resolved problems to display.       Disposition: Home or Self Care    Follow up/Patient Instructions:    Medications:  Reconciled Home Medications:      Medication List      CONTINUE taking these medications    aspirin 81 MG Chew  Take 1 tablet by mouth.     atorvastatin 80 MG tablet  Commonly known as:  LIPITOR     CALCITRATE-VITAMIN D 315 mg- 250 unit Tab  Generic drug:  calcium citrate-vitamin D3     cholecalciferol (vitamin D3) 2,000 unit Tab  Commonly known as:  VITAMIN D3     FREESTYLE LITE STRIPS Strp  Generic drug:  blood sugar diagnostic     glipiZIDE 2.5 MG Tr24  Commonly known as:  GLUCOTROL     JANUMET XR 50-1,000 mg Tm24  Generic drug:  SITagliptan-metformin     losartan 25 MG tablet  Commonly known as:  COZAAR     magnesium oxide 400 mg (241.3 mg magnesium) tablet  Commonly known as:  MAG-OX     MOBIC 15 MG tablet  Generic drug:  meloxicam  Take 15 mg by mouth once daily.     omeprazole 20 MG capsule  Commonly known as:  PRILOSEC     RECLAST 5 mg/100 mL Pgbk  Generic drug:  zoledronic acid-mannitol & water  Inject 5 mg into the vein.     REFRESH OPTIVE SENSITIVE (PF) 0.5-0.9 % Dpet  Generic drug:  carboxymethylcell-glycerin(PF)     sertraline 100 MG tablet  Commonly known as:  ZOLOFT     tiZANidine 4 MG tablet  Commonly known as:  ZANAFLEX  Take 4 mg by mouth every 8 (eight) hours.     traZODone 50 MG tablet  Commonly known as:  DESYREL     VITAMIN B-6 50 MG  Tab  Generic drug:  pyridoxine (vitamin B6)          Discharge Procedure Orders   Call MD for:  temperature >100.4     Call MD for:  persistent nausea and vomiting or diarrhea     Call MD for:  severe uncontrolled pain     Call MD for:  redness, tenderness, or signs of infection (pain, swelling, redness, odor or green/yellow discharge around incision site)     Call MD for:  difficulty breathing or increased cough     Call MD for:  severe persistent headache        Follow up with MD in 2-3 weeks    Discharge Procedure Orders (must include Diet, Follow-up, Activity):   Discharge Procedure Orders (must include Diet, Follow-up, Activity)   Call MD for:  temperature >100.4     Call MD for:  persistent nausea and vomiting or diarrhea     Call MD for:  severe uncontrolled pain     Call MD for:  redness, tenderness, or signs of infection (pain, swelling, redness, odor or green/yellow discharge around incision site)     Call MD for:  difficulty breathing or increased cough     Call MD for:  severe persistent headache

## 2019-07-03 NOTE — PLAN OF CARE
Vss, natalie po fluids, denies pain, ambulates easily. IV removed, catheter intact. Discharge instructions provided and states understanding. States ready to go home.  Discharged from facility with family per wheelchair.

## 2019-07-08 ENCOUNTER — OFFICE VISIT (OUTPATIENT)
Dept: ORTHOPEDICS | Facility: CLINIC | Age: 74
End: 2019-07-08
Payer: MEDICARE

## 2019-07-08 VITALS
SYSTOLIC BLOOD PRESSURE: 118 MMHG | WEIGHT: 130 LBS | HEART RATE: 66 BPM | HEIGHT: 59 IN | DIASTOLIC BLOOD PRESSURE: 70 MMHG | BODY MASS INDEX: 26.21 KG/M2

## 2019-07-08 VITALS
HEIGHT: 59 IN | HEART RATE: 81 BPM | SYSTOLIC BLOOD PRESSURE: 143 MMHG | TEMPERATURE: 98 F | BODY MASS INDEX: 26.22 KG/M2 | OXYGEN SATURATION: 95 % | WEIGHT: 130.06 LBS | DIASTOLIC BLOOD PRESSURE: 64 MMHG | RESPIRATION RATE: 16 BRPM

## 2019-07-08 DIAGNOSIS — S92.356A: Primary | ICD-10-CM

## 2019-07-08 DIAGNOSIS — M50.31 OTHER CERVICAL DISC DEGENERATION, HIGH CERVICAL REGION: ICD-10-CM

## 2019-07-08 DIAGNOSIS — Z98.1 HISTORY OF FUSION OF CERVICAL SPINE: ICD-10-CM

## 2019-07-08 PROCEDURE — 99213 OFFICE O/P EST LOW 20 MIN: CPT | Mod: 25,,, | Performed by: ORTHOPAEDIC SURGERY

## 2019-07-08 PROCEDURE — 99213 PR OFFICE/OUTPT VISIT, EST, LEVL III, 20-29 MIN: ICD-10-PCS | Mod: 25,,, | Performed by: ORTHOPAEDIC SURGERY

## 2019-07-08 PROCEDURE — 73630 PR  X-RAY FOOT 3+ VW: ICD-10-PCS | Mod: LT,,, | Performed by: ORTHOPAEDIC SURGERY

## 2019-07-08 PROCEDURE — 73630 X-RAY EXAM OF FOOT: CPT | Mod: LT,,, | Performed by: ORTHOPAEDIC SURGERY

## 2019-07-08 NOTE — PROGRESS NOTES
Subjective:       Patient ID: Madhuri Manuel is a 73 y.o. female.    Chief Complaint: Injury of the Left Foot (Left foot fx does not currently hurt) and Pain of the Neck (Neck is a lot better. She did have injection done by Dr Polk and that helped alot)      History of Present Illness  Patient is here to follow-up for her neck and her left foot. When she was last here we referred her to Dr. Polk for right C3 and C4 nerve root transforaminal epidural steroid injections. These were completed and her neck and arm pain are much better.  She has been instructed to remain nonweightbearing on the left lower extremity for 6 weeks. She was also given a fracture walker boot to help stabilize her foot. Her neck pain and her foot pain are much improved today.    Current Medications  Current Outpatient Medications   Medication Sig Dispense Refill    aspirin 81 MG Chew Take 1 tablet by mouth.      atorvastatin (LIPITOR) 80 MG tablet       CALCITRATE-VITAMIN D 315-250 mg-unit Tab       carboxymethylcell-glycerin,PF, (REFRESH OPTIVE SENSITIVE, PF,) 0.5-0.9 % Dpet       cholecalciferol, vitamin D3, (VITAMIN D3) 2,000 unit Tab       FREESTYLE LITE STRIPS Strp       glipiZIDE (GLUCOTROL) 2.5 MG TR24       JANUMET XR 50-1,000 mg TM24       losartan (COZAAR) 25 MG tablet       magnesium oxide (MAG-OX) 400 mg (241.3 mg magnesium) tablet       omeprazole (PRILOSEC) 20 MG capsule       sertraline (ZOLOFT) 100 MG tablet       traZODone (DESYREL) 50 MG tablet       VITAMIN B-6 50 MG tablet       zoledronic acid-mannitol & water (RECLAST) 5 mg/100 mL PgBk Inject 5 mg into the vein.       No current facility-administered medications for this visit.        Allergies  Review of patient's allergies indicates:   Allergen Reactions    Penicillins Rash     started after taking 8-10 days of augmentin. She did have a reclast injection in the last 24 hours also but has never had problems with this.      Iodinated contrast- oral and iv dye      Latex Rash       Past Medical History  Past Medical History:   Diagnosis Date    Anemia     Diabetes mellitus, type 2     Fracture of left foot     Hypertension     Osteoporosis        Surgical History  Past Surgical History:   Procedure Laterality Date    CERVICAL FUSION      gastric by pass      kidney stones         Family History:   History reviewed. No pertinent family history.    Social History:   Social History     Socioeconomic History    Marital status:      Spouse name: Not on file    Number of children: Not on file    Years of education: Not on file    Highest education level: Not on file   Occupational History    Not on file   Social Needs    Financial resource strain: Not on file    Food insecurity:     Worry: Not on file     Inability: Not on file    Transportation needs:     Medical: Not on file     Non-medical: Not on file   Tobacco Use    Smoking status: Never Smoker    Smokeless tobacco: Never Used   Substance and Sexual Activity    Alcohol use: Not on file    Drug use: Not on file    Sexual activity: Not on file   Lifestyle    Physical activity:     Days per week: Not on file     Minutes per session: Not on file    Stress: Not on file   Relationships    Social connections:     Talks on phone: Not on file     Gets together: Not on file     Attends Baptism service: Not on file     Active member of club or organization: Not on file     Attends meetings of clubs or organizations: Not on file     Relationship status: Not on file   Other Topics Concern    Not on file   Social History Narrative    Not on file       Hospitalization/Major Diagnostic Procedure:     Review of Systems     General/Constitutional:  Chills denies. Fatigue denies. Fever denies. Weight gain denies. Weight loss denies.    Respiratory:  Shortness of breath denies.    Cardiovascular:  Chest pain denies.    Gastrointestinal:  Constipation denies. Diarrhea denies. Nausea denies. Vomiting denies.      Hematology:  Easy bruising denies. Prolonged bleeding denies.     Genitourinary:  Frequent urination denies. Pain in lower back denies. Painful urination denies.     Musculoskeletal:  See HPI for details    Skin:  Rash denies.    Neurologic:  Dizziness denies. Gait abnormalities denies. Seizures denies. Tingling/Numbess denies.    Psychiatric:  Anxiety denies. Depressed mood denies.     Objective:   Vital Signs:   Vitals:    07/08/19 0928   BP: 118/70   Pulse: 66        Physical Exam      General Examination:     Constitutional: The patient is alert and oriented to lace person and time. Mood is pleasant.     Head/Face: Normal facial features normal eyebrows    Eyes: Normal extraocular motion bilaterally    Lungs: Respirations are equal and unlabored    Gait is coordinated.    Cardiovascular: There are no swelling or varicosities present.    Lymphatic: Negative for adenopathy    Skin: Normal    Neurological: Level of consciousness normal. Oriented to place person and time and situation    Psychiatric: Oriented to time place person and situation    Cervical exam: No significant tenderness palpation or trigger point palpated. Cervical Range of motion close to normal and pain-free. Bilateral upper extremities are distal neurovascular intact. Some generalized deconditioning but no persistent true focal neurological weakness. Anterior incision remains well-healed. Left foot exam demonstrates no significant tenderness palpation over the head of the fifth metatarsal. Mild antalgic gait with fracture walker boot weightbearing as tolerated    XRAY Report/ Interpretation: Left foot x-rays AP, lateral, and oblique taken in the office today and reviewed the patient demonstrate healing of the proximal fifth  metatarsal fracture. Cervical x-rays were not done today secondary to some issues with the machine.      Assessment:       1. Closed nondisplaced fracture of fifth metatarsal bone    2. History of fusion of cervical spine  "   3. Other cervical disc degeneration, high cervical region        Plan:       Madhuri was seen today for injury and pain.    Diagnoses and all orders for this visit:    Closed nondisplaced fracture of fifth metatarsal bone  -     X-Ray Foot Complete Left    History of fusion of cervical spine  -     X-Ray Cervical Spine AP And Lateral    Other cervical disc degeneration, high cervical region         No follow-ups on file.  Sukh Moscoso, physician's assistant served in the capacity as a "scribe" for this patient encounter  A "face to face" encounter occurred with Dr. Diaz on this date  The treatment plan and medical decision making is outlined below:  Continue with fracture walker boot on the left. She is not maintaining nonweightbearing. Therefore I recommend that she continue weightbearing as tolerated as long as she has the boot on. Follow-up in 3 weeks to takes more x-rays and just make sure that her fractures completely healed. If her cervical pain has recurred then we will update her cervical x-rays at her next appointment.          This note was created using Dragon voice recognition software that occasionally misinterpreted phrases or words.  "

## 2019-07-29 ENCOUNTER — OFFICE VISIT (OUTPATIENT)
Dept: ORTHOPEDICS | Facility: CLINIC | Age: 74
End: 2019-07-29
Payer: MEDICARE

## 2019-07-29 VITALS
HEIGHT: 59 IN | WEIGHT: 132 LBS | BODY MASS INDEX: 26.61 KG/M2 | HEART RATE: 69 BPM | DIASTOLIC BLOOD PRESSURE: 64 MMHG | SYSTOLIC BLOOD PRESSURE: 122 MMHG

## 2019-07-29 DIAGNOSIS — Z98.1 HISTORY OF FUSION OF CERVICAL SPINE: ICD-10-CM

## 2019-07-29 DIAGNOSIS — M50.31 OTHER CERVICAL DISC DEGENERATION, HIGH CERVICAL REGION: ICD-10-CM

## 2019-07-29 DIAGNOSIS — S92.354D CLOSED NONDISPLACED FRACTURE OF FIFTH METATARSAL BONE OF RIGHT FOOT WITH ROUTINE HEALING, SUBSEQUENT ENCOUNTER: Primary | ICD-10-CM

## 2019-07-29 PROCEDURE — 99213 OFFICE O/P EST LOW 20 MIN: CPT | Mod: 24,,, | Performed by: ORTHOPAEDIC SURGERY

## 2019-07-29 PROCEDURE — 99024 POSTOP FOLLOW-UP VISIT: CPT | Mod: S$GLB,,, | Performed by: ORTHOPAEDIC SURGERY

## 2019-07-29 PROCEDURE — 99024 PR POST-OP FOLLOW-UP VISIT: ICD-10-PCS | Mod: S$GLB,,, | Performed by: ORTHOPAEDIC SURGERY

## 2019-07-29 PROCEDURE — 99213 PR OFFICE/OUTPT VISIT, EST, LEVL III, 20-29 MIN: ICD-10-PCS | Mod: 24,,, | Performed by: ORTHOPAEDIC SURGERY

## 2019-07-29 NOTE — PROGRESS NOTES
Subjective:       Patient ID: Madhuri Manuel is a 73 y.o. female.    Chief Complaint: Pain of the Left Foot (Left foot fx follow up. Left foot is doing good) and Pain of the Neck (Neck still hurts but the pain does not radiate)      History of Present Illness  Patient is here to follow-up for left foot fifth metatarsal fracture and recurrence of right-sided neck pain without upper extremity radiculitis.    Current Medications  Current Outpatient Medications   Medication Sig Dispense Refill    aspirin 81 MG Chew Take 1 tablet by mouth.      atorvastatin (LIPITOR) 80 MG tablet       CALCITRATE-VITAMIN D 315-250 mg-unit Tab       carboxymethylcell-glycerin,PF, (REFRESH OPTIVE SENSITIVE, PF,) 0.5-0.9 % Dpet       cholecalciferol, vitamin D3, (VITAMIN D3) 2,000 unit Tab       FREESTYLE LITE STRIPS Strp       glipiZIDE (GLUCOTROL) 2.5 MG TR24       JANUMET XR 50-1,000 mg TM24       losartan (COZAAR) 25 MG tablet       magnesium oxide (MAG-OX) 400 mg (241.3 mg magnesium) tablet       omeprazole (PRILOSEC) 20 MG capsule       sertraline (ZOLOFT) 100 MG tablet       traZODone (DESYREL) 50 MG tablet       VITAMIN B-6 50 MG tablet       zoledronic acid-mannitol & water (RECLAST) 5 mg/100 mL PgBk Inject 5 mg into the vein.       No current facility-administered medications for this visit.        Allergies  Review of patient's allergies indicates:   Allergen Reactions    Penicillins Rash     started after taking 8-10 days of augmentin. She did have a reclast injection in the last 24 hours also but has never had problems with this.      Iodinated contrast- oral and iv dye     Latex Rash       Past Medical History  Past Medical History:   Diagnosis Date    Anemia     Diabetes mellitus, type 2     Fracture of left foot     Hypertension     Osteoporosis        Surgical History  Past Surgical History:   Procedure Laterality Date    CERVICAL FUSION      gastric by pass       Injection,steroid,epidural,transforaminal approach C3-4 Right 7/3/2019    Performed by [facility]k [facility] at [facility]    kidney stones         Family History:   History reviewed. No pertinent family history.    Social History:   Social History     Socioeconomic History    Marital status:      Spouse name: Not on file    Number of children: Not on file    Years of education: Not on file    Highest education level: Not on file   Occupational History    Not on file   Social Needs    Financial resource strain: Not on file    Food insecurity:     Worry: Not on file     Inability: Not on file    Transportation needs:     Medical: Not on file     Non-medical: Not on file   Tobacco Use    Smoking status: Never Smoker    Smokeless tobacco: Never Used   Substance and Sexual Activity    Alcohol use: Not on file    Drug use: Not on file    Sexual activity: Not on file   Lifestyle    Physical activity:     Days per week: Not on file     Minutes per session: Not on file    Stress: Not on file   Relationships    Social connections:     Talks on phone: Not on file     Gets together: Not on file     Attends Taoism service: Not on file     Active member of club or organization: Not on file     Attends meetings of clubs or organizations: Not on file     Relationship status: Not on file   Other Topics Concern    Not on file   Social History Narrative    Not on file       Hospitalization/Major Diagnostic Procedure:     Review of Systems     General/Constitutional:  Chills denies. Fatigue denies. Fever denies. Weight gain denies. Weight loss denies.    Respiratory:  Shortness of breath denies.    Cardiovascular:  Chest pain denies.    Gastrointestinal:  Constipation denies. Diarrhea denies. Nausea denies. Vomiting denies.     Hematology:  Easy bruising denies. Prolonged bleeding denies.     Genitourinary:  Frequent urination denies. Pain in lower back denies. Painful urination denies.     Musculoskeletal:  See HPI  for details    Skin:  Rash denies.    Neurologic:  Dizziness denies. Gait abnormalities denies. Seizures denies. Tingling/Numbess denies.    Psychiatric:  Anxiety denies. Depressed mood denies.     Objective:   Vital Signs:   Vitals:    07/29/19 0953   BP: 122/64   Pulse: 69        Physical Exam      General Examination:     Constitutional: The patient is alert and oriented to lace person and time. Mood is pleasant.     Head/Face: Normal facial features normal eyebrows    Eyes: Normal extraocular motion bilaterally    Lungs: Respirations are equal and unlabored    Gait is coordinated.    Cardiovascular: There are no swelling or varicosities present.    Lymphatic: Negative for adenopathy    Skin: Normal    Neurological: Level of consciousness normal. Oriented to place person and time and situation    Psychiatric: Oriented to time place person and situation    Left foot exam demonstrates mildly altered gait with boot. Boot was removed there is no tenderness to palpation over the fifth metatarsal head. Full active range of motion and strength of the left foot and ankle.  Cervical exam: No significant tenderness palpation or trigger point palpated. Cervical Range of motion close to normal and pain-free. Bilateral upper extremities are distal neurovascular intact. Some generalized deconditioning but no persistent true focal neurological weakness. Anterior incision remains well-healed.    XRAY Report/ Interpretation: Left foot AP, lateral, and oblique x-rays were taken in the office today and reviewed the patient and demonstrate that the fracture is well-healed.    Cervical AP and lateral x-rays were taken in the office today and reviewed the patient    Assessment:       1. Closed nondisplaced fracture of fifth metatarsal bone of right foot with routine healing, subsequent encounter    2. History of fusion of cervical spine    3. Other cervical disc degeneration, high cervical region        Plan:       Madhuri was seen  "today for pain and pain.    Diagnoses and all orders for this visit:    Closed nondisplaced fracture of fifth metatarsal bone of right foot with routine healing, subsequent encounter  -     X-Ray Foot Complete Left    History of fusion of cervical spine  -     Ambulatory referral to Pain Clinic    Other cervical disc degeneration, high cervical region  -     Ambulatory referral to Pain Clinic         No follow-ups on file.  Sukh Moscoso, physician's assistant served in the capacity as a "scribe" for this patient encounter  A "face to face" encounter occurred with Dr. Diaz on this date  The treatment plan and medical decision making is outlined below:  Regarding the left foot fracture we recommend advance to weightbearing as tolerated without the walker boot. Regarding the cervical spine we recommend referral back to Dr. Loya to evaluate her and treat her for cervical facet arthropathy with the intent to proceed with medial branch blocks and/or a rhizotomy. He would like to avoid further surgical intervention if at all possible. Follow-up in 2 months or sooner if needed    This note was created using Dragon voice recognition software that occasionally misinterpreted phrases or words.  "

## 2019-08-08 ENCOUNTER — OFFICE VISIT (OUTPATIENT)
Dept: PAIN MEDICINE | Facility: CLINIC | Age: 74
End: 2019-08-08
Payer: MEDICARE

## 2019-08-08 VITALS
HEIGHT: 59 IN | BODY MASS INDEX: 26.61 KG/M2 | WEIGHT: 132 LBS | SYSTOLIC BLOOD PRESSURE: 123 MMHG | HEART RATE: 76 BPM | DIASTOLIC BLOOD PRESSURE: 73 MMHG

## 2019-08-08 DIAGNOSIS — M50.30 DDD (DEGENERATIVE DISC DISEASE), CERVICAL: ICD-10-CM

## 2019-08-08 DIAGNOSIS — M54.12 CERVICAL RADICULITIS: ICD-10-CM

## 2019-08-08 DIAGNOSIS — M47.892 OTHER SPONDYLOSIS, CERVICAL REGION: Primary | ICD-10-CM

## 2019-08-08 PROCEDURE — 99999 PR PBB SHADOW E&M-EST. PATIENT-LVL III: CPT | Mod: PBBFAC,,, | Performed by: ANESTHESIOLOGY

## 2019-08-08 PROCEDURE — 99204 PR OFFICE/OUTPT VISIT, NEW, LEVL IV, 45-59 MIN: ICD-10-PCS | Mod: S$PBB,,, | Performed by: ANESTHESIOLOGY

## 2019-08-08 PROCEDURE — 99213 OFFICE O/P EST LOW 20 MIN: CPT | Mod: PBBFAC,PN | Performed by: ANESTHESIOLOGY

## 2019-08-08 PROCEDURE — 99999 PR PBB SHADOW E&M-EST. PATIENT-LVL III: ICD-10-PCS | Mod: PBBFAC,,, | Performed by: ANESTHESIOLOGY

## 2019-08-08 PROCEDURE — 99204 OFFICE O/P NEW MOD 45 MIN: CPT | Mod: S$PBB,,, | Performed by: ANESTHESIOLOGY

## 2019-08-08 NOTE — PROGRESS NOTES
This note was completed with dictation software and grammatical errors may exist.    Referring Physician: Wilver Diaz MD    PCP: [id number]      CC:  Right-sided neck pain    HPI:   Madhuri Manuel is a 73 y.o. female referred to us for right-sided neck pain. Pain has been present for 5 years.  She has history of cervical fusion in 2017.  She presents to us with constant burning, throbbing pain in her neck.  Pain radiates to her right shoulder.  Pain did radiate down her right arm.  She underwent a right-sided t cervical transforaminal epidural steroid injection on July 3, 2019 which help with her radiating right arm pain.  Neck and right shoulder pain still remains in his bothersome.  Pain worsens with lateral rotation, extension lifting.  Pain improves with rest.  She is referred back for spine surgery for consideration of cervical medial branch blocks.  She denies any worsening weakness.  No bowel bladder changes.    ROS:  CONSTITUTIONAL: No fevers, chills, night sweats, wt. loss, appetite changes  SKIN: no rashes or itching  ENT: No headaches, head trauma, vision changes, or eye pain  LYMPH NODES: None noticed   CV: No chest pain, palpitations.   RESP: No shortness of breath, dyspnea on exertion, cough, wheezing, or hemoptysis  GI: No nausea, emesis, diarrhea, constipation, melena, hematochezia, pain.    : No dysuria, hematuria, urgency, or frequency   HEME: No easy bruising, bleeding problems  PSYCHIATRIC: No depression, anxiety, psychosis, hallucinations.  NEURO: No seizures, memory loss, dizziness or difficulty sleeping  MSK:  Positive HPI      Past Medical History:   Diagnosis Date    Anemia     Diabetes mellitus, type 2     Fracture of left foot     Hypertension     Osteoporosis      Past Surgical History:   Procedure Laterality Date    CERVICAL FUSION      gastric by pass      Injection,steroid,epidural,transforaminal approach C3-4 Right 7/3/2019    Performed by Vasquez Polk,  "MD at [facility] OR    kidney stones       No family history on file.  Social History     Socioeconomic History    Marital status:      Spouse name: Not on file    Number of children: Not on file    Years of education: Not on file    Highest education level: Not on file   Occupational History    Not on file   Social Needs    Financial resource strain: Not on file    Food insecurity:     Worry: Not on file     Inability: Not on file    Transportation needs:     Medical: Not on file     Non-medical: Not on file   Tobacco Use    Smoking status: Never Smoker    Smokeless tobacco: Never Used   Substance and Sexual Activity    Alcohol use: Not on file    Drug use: Not on file    Sexual activity: Not on file   Lifestyle    Physical activity:     Days per week: Not on file     Minutes per session: Not on file    Stress: Not on file   Relationships    Social connections:     Talks on phone: Not on file     Gets together: Not on file     Attends Zoroastrianism service: Not on file     Active member of club or organization: Not on file     Attends meetings of clubs or organizations: Not on file     Relationship status: Not on file   Other Topics Concern    Not on file   Social History Narrative    Not on file         Medications/Allergies: See med card    Vitals:    08/08/19 0845   BP: 123/73   Pulse: 76   Weight: 59.9 kg (132 lb)   Height: 4' 11" (1.499 m)   PainSc:   6   PainLoc: Neck         Physical exam:    GENERAL: A and O x3, the patient appears well groomed and is in no acute distress.  Skin: No rashes or obvious lesions  HEENT: normocephalic, atraumatic  CARDIOVASCULAR:  Palpable peripheral pulses  LUNGS: easy work of breathing  ABDOMEN: soft, nontender   UPPER EXTREMITIES: Normal alignment, normal range of motion, no atrophy, no skin changes,  hair growth and nail growth normal and equal bilaterally. No swelling, no tenderness.    LOWER EXTREMITIES:  Normal alignment, normal range of motion, no " atrophy, no skin changes,  hair growth and nail growth normal and equal bilaterally. No swelling, no tenderness.  CERVICAL SPINE:  Cervical spine: ROM is limited in flexion, extension and lateral rotation with moderate increased pain.  Spurling's maneuver causes neck pain to right side.  Myofascial exam: No Tenderness to palpation across cervical paraspinous region bilaterally.    MENTAL STATUS: normal orientation, speech, language, and fund of knowledge for social situation.  Emotional state appropriate.    CRANIAL NERVES:  II:  PERRL bilaterally,   III,IV,VI: EOMI.    V:  Facial sensation equal bilaterally  VII:  Facial motor function normal.  VIII:  Hearing equal to finger rub bilaterally  IX/X: Gag normal, palate symmetric  XI:  Shoulder shrug equal, head turn equal  XII:  Tongue midline without fasciculations      MOTOR: Tone and bulk: normal bilateral upper and lower Strength: normal   Delt Bi Tri WE WF     R 5 5 5 5 5 5   L 5 5 5 5 5 5     IP ADD ABD Quad TA Gas HAM  R 5 5 5 5 5 5 5  L 5 5 5 5 5 5 5    SENSATION: Light touch and pinprick intact bilaterally  REFLEXES: normal, symmetric, nonbrisk.  Toes down, no clonus. No hoffmans.  GAIT: normal rise, base, steps, and arm swing.        Imaging:  MRI C-spine 5/2019  There are surgical changes related to previous instrumented multilevel anterior  fusion with plate and screw fixation extending from C4 to C7. The vertebral  bodies are appropriately maintained in height. There is mild retrolisthesis at  C3-C4 and minimal anterolisthesis at C7-T1. There is mild wedge-shaped  configuration of the T3 vertebral body with Schmorl's node defects observed  within both the superior and inferior endplate. There is no associated marrow  edema.    At C2-C3, shallow disc bulging and osteophytic ridging results in a mild degree  of mass effect upon the thecal sac towards the right of midline without  impingement of the cervical cord. Facet degenerative changes contribute  to  minor right foraminal narrowing.    At C3-C4, there is a large extrusion of the disc margin extending from the far  right lateral canal into the proximal right neural foramen. This is  superimposed on broad-based disc bulging and posterior osteophytic ridging.  Focal mass effect upon the right side of the thecal sac contacts and slightly  deforms the cervical cord towards the right of midline. Facet degenerative  changes contribute to severe right foraminal stenosis. There is associated mild  signal alteration within the substance of the cord at this level, likely  reflection of myelomalacia. There is a mild-moderate degree of left foraminal  narrowing.    At C4-C5, shallow disc bulging and osteophytic ridging results in mild mass  effect upon the ventral margin of the thecal sac with asymmetry towards the  right of midline. Facet degenerative changes contribute to moderately severe  degrees of foraminal encroachment.    At C5-C6, posterior marginal osteophyte formation extends from the far right  lateral canal into the right neural foramen contributing to asymmetric mass  effect upon the thecal sac without direct cord impingement. There is severe  right foraminal stenosis. Facet degenerative changes contribute to moderately  severe left foraminal narrowing.    At C6-C7, shallow disc bulging results in mild asymmetric mass effect upon the  thecal sac towards the right of midline. Facet degenerative changes contribute  to moderately severe right foraminal and moderate left foraminal narrowing.    At C7-T1, there is no significant disc bulging or focal soft disc herniation.  Facet degenerative changes which contributes to mild degrees of foraminal  narrowing.    Assessment:  Patient referred for neck and right shoulder pain  1. Other spondylosis, cervical region    2. DDD (degenerative disc disease), cervical    3. Cervical radiculitis          Plan:  1. I have stressed the importance of physical activity and  exercise to improve overall health  2. Reviewed pertinent imaging and records with patient.   3.  Discussed performing diagnostic right-sided cervical medial branch blocks to see if her pain is facetogenic in nature.  Patient wishes to proceed with procedure. If successful, we will proceed with radiofrequency ablation.  4.  She will follow up after the procedure.      Thank you for referring this interesting patient, and I look forward to continuing to collaborate in her care.

## 2019-08-08 NOTE — H&P (VIEW-ONLY)
This note was completed with dictation software and grammatical errors may exist.    Referring Physician: Wilver Diaz MD    PCP: [id number]      CC:  Right-sided neck pain    HPI:   Madhuri Manuel is a 73 y.o. female referred to us for right-sided neck pain. Pain has been present for 5 years.  She has history of cervical fusion in 2017.  She presents to us with constant burning, throbbing pain in her neck.  Pain radiates to her right shoulder.  Pain did radiate down her right arm.  She underwent a right-sided t cervical transforaminal epidural steroid injection on July 3, 2019 which help with her radiating right arm pain.  Neck and right shoulder pain still remains in his bothersome.  Pain worsens with lateral rotation, extension lifting.  Pain improves with rest.  She is referred back for spine surgery for consideration of cervical medial branch blocks.  She denies any worsening weakness.  No bowel bladder changes.    ROS:  CONSTITUTIONAL: No fevers, chills, night sweats, wt. loss, appetite changes  SKIN: no rashes or itching  ENT: No headaches, head trauma, vision changes, or eye pain  LYMPH NODES: None noticed   CV: No chest pain, palpitations.   RESP: No shortness of breath, dyspnea on exertion, cough, wheezing, or hemoptysis  GI: No nausea, emesis, diarrhea, constipation, melena, hematochezia, pain.    : No dysuria, hematuria, urgency, or frequency   HEME: No easy bruising, bleeding problems  PSYCHIATRIC: No depression, anxiety, psychosis, hallucinations.  NEURO: No seizures, memory loss, dizziness or difficulty sleeping  MSK:  Positive HPI      Past Medical History:   Diagnosis Date    Anemia     Diabetes mellitus, type 2     Fracture of left foot     Hypertension     Osteoporosis      Past Surgical History:   Procedure Laterality Date    CERVICAL FUSION      gastric by pass      Injection,steroid,epidural,transforaminal approach C3-4 Right 7/3/2019    Performed by Vasquez Polk,  "MD at [facility] OR    kidney stones       No family history on file.  Social History     Socioeconomic History    Marital status:      Spouse name: Not on file    Number of children: Not on file    Years of education: Not on file    Highest education level: Not on file   Occupational History    Not on file   Social Needs    Financial resource strain: Not on file    Food insecurity:     Worry: Not on file     Inability: Not on file    Transportation needs:     Medical: Not on file     Non-medical: Not on file   Tobacco Use    Smoking status: Never Smoker    Smokeless tobacco: Never Used   Substance and Sexual Activity    Alcohol use: Not on file    Drug use: Not on file    Sexual activity: Not on file   Lifestyle    Physical activity:     Days per week: Not on file     Minutes per session: Not on file    Stress: Not on file   Relationships    Social connections:     Talks on phone: Not on file     Gets together: Not on file     Attends Nondenominational service: Not on file     Active member of club or organization: Not on file     Attends meetings of clubs or organizations: Not on file     Relationship status: Not on file   Other Topics Concern    Not on file   Social History Narrative    Not on file         Medications/Allergies: See med card    Vitals:    08/08/19 0845   BP: 123/73   Pulse: 76   Weight: 59.9 kg (132 lb)   Height: 4' 11" (1.499 m)   PainSc:   6   PainLoc: Neck         Physical exam:    GENERAL: A and O x3, the patient appears well groomed and is in no acute distress.  Skin: No rashes or obvious lesions  HEENT: normocephalic, atraumatic  CARDIOVASCULAR:  Palpable peripheral pulses  LUNGS: easy work of breathing  ABDOMEN: soft, nontender   UPPER EXTREMITIES: Normal alignment, normal range of motion, no atrophy, no skin changes,  hair growth and nail growth normal and equal bilaterally. No swelling, no tenderness.    LOWER EXTREMITIES:  Normal alignment, normal range of motion, no " atrophy, no skin changes,  hair growth and nail growth normal and equal bilaterally. No swelling, no tenderness.  CERVICAL SPINE:  Cervical spine: ROM is limited in flexion, extension and lateral rotation with moderate increased pain.  Spurling's maneuver causes neck pain to right side.  Myofascial exam: No Tenderness to palpation across cervical paraspinous region bilaterally.    MENTAL STATUS: normal orientation, speech, language, and fund of knowledge for social situation.  Emotional state appropriate.    CRANIAL NERVES:  II:  PERRL bilaterally,   III,IV,VI: EOMI.    V:  Facial sensation equal bilaterally  VII:  Facial motor function normal.  VIII:  Hearing equal to finger rub bilaterally  IX/X: Gag normal, palate symmetric  XI:  Shoulder shrug equal, head turn equal  XII:  Tongue midline without fasciculations      MOTOR: Tone and bulk: normal bilateral upper and lower Strength: normal   Delt Bi Tri WE WF     R 5 5 5 5 5 5   L 5 5 5 5 5 5     IP ADD ABD Quad TA Gas HAM  R 5 5 5 5 5 5 5  L 5 5 5 5 5 5 5    SENSATION: Light touch and pinprick intact bilaterally  REFLEXES: normal, symmetric, nonbrisk.  Toes down, no clonus. No hoffmans.  GAIT: normal rise, base, steps, and arm swing.        Imaging:  MRI C-spine 5/2019  There are surgical changes related to previous instrumented multilevel anterior  fusion with plate and screw fixation extending from C4 to C7. The vertebral  bodies are appropriately maintained in height. There is mild retrolisthesis at  C3-C4 and minimal anterolisthesis at C7-T1. There is mild wedge-shaped  configuration of the T3 vertebral body with Schmorl's node defects observed  within both the superior and inferior endplate. There is no associated marrow  edema.    At C2-C3, shallow disc bulging and osteophytic ridging results in a mild degree  of mass effect upon the thecal sac towards the right of midline without  impingement of the cervical cord. Facet degenerative changes contribute  to  minor right foraminal narrowing.    At C3-C4, there is a large extrusion of the disc margin extending from the far  right lateral canal into the proximal right neural foramen. This is  superimposed on broad-based disc bulging and posterior osteophytic ridging.  Focal mass effect upon the right side of the thecal sac contacts and slightly  deforms the cervical cord towards the right of midline. Facet degenerative  changes contribute to severe right foraminal stenosis. There is associated mild  signal alteration within the substance of the cord at this level, likely  reflection of myelomalacia. There is a mild-moderate degree of left foraminal  narrowing.    At C4-C5, shallow disc bulging and osteophytic ridging results in mild mass  effect upon the ventral margin of the thecal sac with asymmetry towards the  right of midline. Facet degenerative changes contribute to moderately severe  degrees of foraminal encroachment.    At C5-C6, posterior marginal osteophyte formation extends from the far right  lateral canal into the right neural foramen contributing to asymmetric mass  effect upon the thecal sac without direct cord impingement. There is severe  right foraminal stenosis. Facet degenerative changes contribute to moderately  severe left foraminal narrowing.    At C6-C7, shallow disc bulging results in mild asymmetric mass effect upon the  thecal sac towards the right of midline. Facet degenerative changes contribute  to moderately severe right foraminal and moderate left foraminal narrowing.    At C7-T1, there is no significant disc bulging or focal soft disc herniation.  Facet degenerative changes which contributes to mild degrees of foraminal  narrowing.    Assessment:  Patient referred for neck and right shoulder pain  1. Other spondylosis, cervical region    2. DDD (degenerative disc disease), cervical    3. Cervical radiculitis          Plan:  1. I have stressed the importance of physical activity and  exercise to improve overall health  2. Reviewed pertinent imaging and records with patient.   3.  Discussed performing diagnostic right-sided cervical medial branch blocks to see if her pain is facetogenic in nature.  Patient wishes to proceed with procedure. If successful, we will proceed with radiofrequency ablation.  4.  She will follow up after the procedure.      Thank you for referring this interesting patient, and I look forward to continuing to collaborate in her care.

## 2019-08-15 DIAGNOSIS — M47.892 OTHER SPONDYLOSIS, CERVICAL REGION: Primary | ICD-10-CM

## 2019-08-30 ENCOUNTER — HOSPITAL ENCOUNTER (OUTPATIENT)
Facility: AMBULARY SURGERY CENTER | Age: 74
Discharge: HOME | End: 2019-08-30
Attending: ANESTHESIOLOGY | Admitting: ANESTHESIOLOGY
Payer: MEDICARE

## 2019-08-30 DIAGNOSIS — M47.892 OTHER SPONDYLOSIS, CERVICAL REGION: Primary | ICD-10-CM

## 2019-08-30 LAB — POCT GLUCOSE: 162 MG/DL (ref 70–110)

## 2019-08-30 PROCEDURE — 64491 PR INJ DX/THER AGNT PARAVERT FACET JOINT,IMG GUIDE,CERV/THORAC, 2ND LEVEL: ICD-10-PCS | Mod: RT,,, | Performed by: ANESTHESIOLOGY

## 2019-08-30 PROCEDURE — 64492 INJ PARAVERT F JNT C/T 3 LEV: CPT | Mod: RT,,, | Performed by: ANESTHESIOLOGY

## 2019-08-30 PROCEDURE — 64491 INJ PARAVERT F JNT C/T 2 LEV: CPT | Mod: RT,,, | Performed by: ANESTHESIOLOGY

## 2019-08-30 PROCEDURE — 64492 INJ PARAVERT F JNT C/T 3 LEV: CPT | Performed by: ANESTHESIOLOGY

## 2019-08-30 PROCEDURE — 99152 PR MOD CONSCIOUS SEDATION, SAME PHYS, 5+ YRS, FIRST 15 MIN: ICD-10-PCS | Mod: ,,, | Performed by: ANESTHESIOLOGY

## 2019-08-30 PROCEDURE — 64491 INJ PARAVERT F JNT C/T 2 LEV: CPT | Performed by: ANESTHESIOLOGY

## 2019-08-30 PROCEDURE — 99152 MOD SED SAME PHYS/QHP 5/>YRS: CPT | Mod: ,,, | Performed by: ANESTHESIOLOGY

## 2019-08-30 PROCEDURE — 64490 INJ PARAVERT F JNT C/T 1 LEV: CPT | Mod: RT,,, | Performed by: ANESTHESIOLOGY

## 2019-08-30 PROCEDURE — 64492 PR INJ DX/THER AGNT PARAVERT FACET JOINT,IMG GUIDE,CERV/THORAC, ADD LEVEL: ICD-10-PCS | Mod: RT,,, | Performed by: ANESTHESIOLOGY

## 2019-08-30 PROCEDURE — 64490 INJ PARAVERT F JNT C/T 1 LEV: CPT | Performed by: ANESTHESIOLOGY

## 2019-08-30 PROCEDURE — 64490 PR INJ DX/THER AGNT PARAVERT FACET JOINT,IMG GUIDE,CERV/THORAC, 1ST LEVEL: ICD-10-PCS | Mod: RT,,, | Performed by: ANESTHESIOLOGY

## 2019-08-30 RX ORDER — SODIUM CHLORIDE, SODIUM LACTATE, POTASSIUM CHLORIDE, CALCIUM CHLORIDE 600; 310; 30; 20 MG/100ML; MG/100ML; MG/100ML; MG/100ML
INJECTION, SOLUTION INTRAVENOUS CONTINUOUS
Status: DISCONTINUED | OUTPATIENT
Start: 2019-08-30 | End: 2019-08-30 | Stop reason: HOSPADM

## 2019-08-30 RX ORDER — BUPIVACAINE HYDROCHLORIDE 5 MG/ML
INJECTION, SOLUTION EPIDURAL; INTRACAUDAL
Status: DISCONTINUED | OUTPATIENT
Start: 2019-08-30 | End: 2019-08-30 | Stop reason: HOSPADM

## 2019-08-30 RX ORDER — LIDOCAINE HYDROCHLORIDE 10 MG/ML
INJECTION, SOLUTION EPIDURAL; INFILTRATION; INTRACAUDAL; PERINEURAL
Status: DISCONTINUED | OUTPATIENT
Start: 2019-08-30 | End: 2019-08-30 | Stop reason: HOSPADM

## 2019-08-30 RX ORDER — MIDAZOLAM HYDROCHLORIDE 2 MG/2ML
INJECTION, SOLUTION INTRAMUSCULAR; INTRAVENOUS
Status: DISCONTINUED | OUTPATIENT
Start: 2019-08-30 | End: 2019-08-30 | Stop reason: HOSPADM

## 2019-08-30 RX ADMIN — SODIUM CHLORIDE, SODIUM LACTATE, POTASSIUM CHLORIDE, CALCIUM CHLORIDE: 600; 310; 30; 20 INJECTION, SOLUTION INTRAVENOUS at 11:08

## 2019-08-30 NOTE — OP NOTE
DATE: 8/30/2019    PROCEDURE:  Cervical medial branch block of the C3,4,5,6 medial branch nerves on the right-side utilizing fluoroscopy    DIAGNOSIS:  Other cervical spondylosis    PHYSICIAN: Vasquez Polk MD    MEDICATIONS INJECTED:  0.5% bupivicaine, 0.5ml at each level.    LOCAL ANESTHETIC USED:   1% lidocaine, 1ml at each level.    SEDATION MEDICATIONS: RN IV Versed    ESTIMATED BLOOD LOSS:  None    COMPLICATIONS:  None    TECHNIQUE : A time-out was taken to identify patient and procedure side prior to starting the procedure.  The patient was positioned in the prone position. The patient was prepped and draped in the usual sterile fashion using ChloraPrep and sterile towels.  The level was determined under fluoroscopic guidance using a slightly posteriorly oblique view.   Local anesthetic was infiltrated superficially at the skin level.  Then, a 3.5 inch 25 gauge needle was inserted to the anatomic location of the midsection of the lateral masses of C3,4,5,6 on the right side(s). A cross table view was then taken to ensure that needles did not cross into neural foramina.  The above noted medication was then injected. The patient tolerated the procedure well.     The patient was monitored after the procedure. Patient given a pain diary to document progress after procedure.  If found to have greater than a 50% recovery and so will be scheduled for a radiofrequency ablation of the corresponding nerves.     The patient was given post procedure and discharge instructions to follow at home. The patient was discharged in a stable condition

## 2019-08-30 NOTE — DISCHARGE INSTRUCTIONS
Before leaving, please make sure you have all your personal belongings such as glasses, purses, wallets, keys, cell phones, jewelry, jackets etc    Anesthesia information    Anesthesia Safety      You have been given medicine  to sedate you during your procedure today. This may have included both a pain medicine and sleeping medicine. Most of the effects have worn off; however, you may continue to have some drowsiness for the next  24 hours. Anesthesia and pain medicines can cause nausea, sleepiness, dizziness and  constipation.    HOME CARE:  1) For the next EIGHT HOURS, you should be watched by a responsible adult to look for any worsening of your condition.  2) DO NOT DRINK any ALCOHOL for the next 24 HOURS.  3) DO NOT DRIVE or operate dangerous machinery during the next 24 HOURS.  FOLLOW UP with your doctor or this facility if you are not alert and back to your usual level of activity within 24 hrs.  GET PROMPT MEDICAL ATTENTION if any of the following occur:  -- Increased drowsiness  -- Increased weakness or dizziness  -- Repeated vomiting  -- If you cannot be awakened    Nerve Block  This was a test, not a treatment. Your pain may return.  Keep your pain journal Dr office will call to check your pain levels within 3 days   Perform activities that normally cause you pain during this testing period    Home care instructions  You may apply ice pack to the injection site for 2 days , NO HEAT for 2 days  You may take a shower but no soaking above level of injection in tub or pool for 2 days  Resume Aspirin, Plavix, or Coumadin the day after the procedure unless otherwise instructed.  Do not drive for 24 hr      SEEK  IMMEDIATE MEDICAL HELP FOR:  Severe increase in your usual pain or appearance of new pain  Prolonged  ( more than 8 hours)or increasing weakness or numbness in the legs or arms  Drainage, redness, active bleeding, or increased swelling at the injection site  Temperature over 100.0 degrees F.  Headache  that increases when your head is upright and decreases when you lie flat  Shortness of breath, chest pain, or problems breathing

## 2019-08-30 NOTE — PLAN OF CARE
"Dc criteria met. Pain remains "8"/10. Pt states tolerable and she is "used to it" Denies further questions. Home with daughter  via personal vehicle.  "

## 2019-08-30 NOTE — DISCHARGE SUMMARY
[facility]  Discharge Note  Short Stay    Admit Date: 8/30/2019    Discharge Date and Time: 8/30/2019    Attending Physician: Vasquez Polk MD     Discharge Provider: Vasquez Polk    Diagnoses:  Active Hospital Problems    Diagnosis  POA    *Other spondylosis, cervical region [M47.892]  Yes      Resolved Hospital Problems   No resolved problems to display.       Hospital Course: Cervical MBB  Discharged Condition: Good    Final Diagnoses:   Active Hospital Problems    Diagnosis  POA    *Other spondylosis, cervical region [M47.892]  Yes      Resolved Hospital Problems   No resolved problems to display.       Disposition: Home or Self Care    Follow up/Patient Instructions:    Medications:  Reconciled Home Medications:      Medication List      CONTINUE taking these medications    aspirin 81 MG Chew  Take 1 tablet by mouth.     atorvastatin 80 MG tablet  Commonly known as:  LIPITOR     CALCITRATE-VITAMIN D 315 mg- 250 unit Tab  Generic drug:  calcium citrate-vitamin D3     cholecalciferol (vitamin D3) 2,000 unit Tab  Commonly known as:  VITAMIN D3     FREESTYLE LITE STRIPS Strp  Generic drug:  blood sugar diagnostic     glipiZIDE 2.5 MG Tr24  Commonly known as:  GLUCOTROL     JANUMET XR 50-1,000 mg Tm24  Generic drug:  SITagliptan-metformin     losartan 25 MG tablet  Commonly known as:  COZAAR     magnesium oxide 400 mg (241.3 mg magnesium) tablet  Commonly known as:  MAG-OX     omeprazole 20 MG capsule  Commonly known as:  PRILOSEC     RECLAST 5 mg/100 mL Pgbk  Generic drug:  zoledronic acid-mannitol & water  Inject 5 mg into the vein.     REFRESH OPTIVE SENSITIVE (PF) 0.5-0.9 % Dpet  Generic drug:  carboxymethylcell-glycerin(PF)     sertraline 100 MG tablet  Commonly known as:  ZOLOFT     traZODone 50 MG tablet  Commonly known as:  DESYREL     VITAMIN B-6 50 MG Tab  Generic drug:  pyridoxine (vitamin B6)          Discharge Procedure Orders   Call MD for:  temperature >100.4     Call MD for:  persistent  nausea and vomiting or diarrhea     Call MD for:  severe uncontrolled pain     Call MD for:  redness, tenderness, or signs of infection (pain, swelling, redness, odor or green/yellow discharge around incision site)     Call MD for:  difficulty breathing or increased cough     Call MD for:  severe persistent headache        Follow up with MD in 2-3 weeks    Discharge Procedure Orders (must include Diet, Follow-up, Activity):   Discharge Procedure Orders (must include Diet, Follow-up, Activity)   Call MD for:  temperature >100.4     Call MD for:  persistent nausea and vomiting or diarrhea     Call MD for:  severe uncontrolled pain     Call MD for:  redness, tenderness, or signs of infection (pain, swelling, redness, odor or green/yellow discharge around incision site)     Call MD for:  difficulty breathing or increased cough     Call MD for:  severe persistent headache

## 2019-09-03 VITALS
SYSTOLIC BLOOD PRESSURE: 144 MMHG | HEIGHT: 59 IN | TEMPERATURE: 98 F | OXYGEN SATURATION: 97 % | WEIGHT: 132.06 LBS | BODY MASS INDEX: 26.62 KG/M2 | HEART RATE: 63 BPM | RESPIRATION RATE: 18 BRPM | DIASTOLIC BLOOD PRESSURE: 65 MMHG

## 2019-09-05 ENCOUNTER — TELEPHONE (OUTPATIENT)
Dept: PAIN MEDICINE | Facility: CLINIC | Age: 74
End: 2019-09-05

## 2019-09-05 NOTE — TELEPHONE ENCOUNTER
Spoke to pt states she has received an 80% or more decrease in pain following MBB Right Cervical C3,4,5,6 and would like to continue with the RFA Cervical Right C3,4,5,6. Scheduled procedure for 9/20/19

## 2019-09-06 DIAGNOSIS — M47.892 OTHER SPONDYLOSIS, CERVICAL REGION: Primary | ICD-10-CM

## 2019-09-20 ENCOUNTER — HOSPITAL ENCOUNTER (OUTPATIENT)
Facility: AMBULARY SURGERY CENTER | Age: 74
Discharge: HOME | End: 2019-09-20
Attending: ANESTHESIOLOGY | Admitting: ANESTHESIOLOGY
Payer: MEDICARE

## 2019-09-20 DIAGNOSIS — M47.892 OTHER SPONDYLOSIS, CERVICAL REGION: Primary | ICD-10-CM

## 2019-09-20 LAB — POCT GLUCOSE: 149 MG/DL (ref 70–110)

## 2019-09-20 PROCEDURE — 99152 PR MOD CONSCIOUS SEDATION, SAME PHYS, 5+ YRS, FIRST 15 MIN: ICD-10-PCS | Mod: ,,, | Performed by: ANESTHESIOLOGY

## 2019-09-20 PROCEDURE — 64633 DESTROY CERV/THOR FACET JNT: CPT | Mod: ,,, | Performed by: ANESTHESIOLOGY

## 2019-09-20 PROCEDURE — 64634 PR DESTROY C/TH FACET JNT ADDL: ICD-10-PCS | Mod: ,,, | Performed by: ANESTHESIOLOGY

## 2019-09-20 PROCEDURE — 64634 DESTROY C/TH FACET JNT ADDL: CPT | Mod: ,,, | Performed by: ANESTHESIOLOGY

## 2019-09-20 PROCEDURE — 64633 DESTROY CERV/THOR FACET JNT: CPT | Performed by: ANESTHESIOLOGY

## 2019-09-20 PROCEDURE — 64633 PR DESTROY CERV/THOR FACET JNT: ICD-10-PCS | Mod: ,,, | Performed by: ANESTHESIOLOGY

## 2019-09-20 PROCEDURE — 64634 DESTROY C/TH FACET JNT ADDL: CPT | Performed by: ANESTHESIOLOGY

## 2019-09-20 PROCEDURE — 99152 MOD SED SAME PHYS/QHP 5/>YRS: CPT | Mod: ,,, | Performed by: ANESTHESIOLOGY

## 2019-09-20 RX ORDER — METHYLPREDNISOLONE ACETATE 80 MG/ML
INJECTION, SUSPENSION INTRA-ARTICULAR; INTRALESIONAL; INTRAMUSCULAR; SOFT TISSUE
Status: DISCONTINUED | OUTPATIENT
Start: 2019-09-20 | End: 2019-09-20 | Stop reason: HOSPADM

## 2019-09-20 RX ORDER — LIDOCAINE HYDROCHLORIDE 10 MG/ML
INJECTION, SOLUTION EPIDURAL; INFILTRATION; INTRACAUDAL; PERINEURAL
Status: DISCONTINUED | OUTPATIENT
Start: 2019-09-20 | End: 2019-09-20 | Stop reason: HOSPADM

## 2019-09-20 RX ORDER — FENTANYL CITRATE 50 UG/ML
INJECTION, SOLUTION INTRAMUSCULAR; INTRAVENOUS
Status: DISCONTINUED | OUTPATIENT
Start: 2019-09-20 | End: 2019-09-20 | Stop reason: HOSPADM

## 2019-09-20 RX ORDER — LIDOCAINE HYDROCHLORIDE 20 MG/ML
INJECTION, SOLUTION EPIDURAL; INFILTRATION; INTRACAUDAL; PERINEURAL
Status: DISCONTINUED | OUTPATIENT
Start: 2019-09-20 | End: 2019-09-20 | Stop reason: HOSPADM

## 2019-09-20 RX ORDER — SODIUM CHLORIDE, SODIUM LACTATE, POTASSIUM CHLORIDE, CALCIUM CHLORIDE 600; 310; 30; 20 MG/100ML; MG/100ML; MG/100ML; MG/100ML
INJECTION, SOLUTION INTRAVENOUS CONTINUOUS
Status: DISCONTINUED | OUTPATIENT
Start: 2019-09-20 | End: 2019-09-20 | Stop reason: HOSPADM

## 2019-09-20 RX ORDER — BUPIVACAINE HYDROCHLORIDE 2.5 MG/ML
INJECTION, SOLUTION EPIDURAL; INFILTRATION; INTRACAUDAL
Status: DISCONTINUED | OUTPATIENT
Start: 2019-09-20 | End: 2019-09-20 | Stop reason: HOSPADM

## 2019-09-20 RX ORDER — MIDAZOLAM HYDROCHLORIDE 1 MG/ML
INJECTION INTRAMUSCULAR; INTRAVENOUS
Status: DISCONTINUED | OUTPATIENT
Start: 2019-09-20 | End: 2019-09-20 | Stop reason: HOSPADM

## 2019-09-20 RX ADMIN — SODIUM CHLORIDE, SODIUM LACTATE, POTASSIUM CHLORIDE, CALCIUM CHLORIDE: 600; 310; 30; 20 INJECTION, SOLUTION INTRAVENOUS at 12:09

## 2019-09-20 NOTE — DISCHARGE INSTRUCTIONS
Before leaving, please make sure you have all your personal belongings such as glasses, purses, wallets, keys, cell phones, jewelry, jackets etc      Anesthesia information    Anesthesia Safety      You have been given medicine  to sedate you during your procedure today. This may have included both a pain medicine and sleeping medicine. Most of the effects have worn off; however, you may continue to have some drowsiness for the next  24 hours. Anesthesia and pain medicines can cause nausea, sleepiness, dizziness and  constipation.    HOME CARE:  1) For the next EIGHT HOURS, you should be watched by a responsible adult to look for any worsening of your condition.  2) DO NOT DRINK any ALCOHOL for the next 24 HOURS.  3) DO NOT DRIVE or operate dangerous machinery during the next 24 HOURS.  FOLLOW UP with your doctor or this facility if you are not alert and back to your usual level of activity within 24 hrs.  GET PROMPT MEDICAL ATTENTION if any of the following occur:  -- Increased drowsiness  -- Increased weakness or dizziness  -- Repeated vomiting  -- If you cannot be awakened     Radiofrequency of Nerves    After this procedure you may have increased pain for three days and lingering pain for up to 6 weeks.   Most patients feel better after 4-6  weeks.    Use your pain medications as needed but the goal of this treartment is to wean you off your pain medication.  You may have weakness after the procedure due to the numbing injection.  You may feel a sunburn effect and some patients may need a burn cream for the skin after 2 days.    Use ice pack for discomfort :apply for 20 minutes, remove for 20 minutes for up to 2 days. Do not sleep with ice pack.  Do not use a heating pad or take tub baths or swim for 2 days.  You may shower today  Gradually increase your activities.  Dont lift anything over 10 lbs for the first 24 hours  Dont drive the day of the procedure Wait 24 hrs to drive.  Wait until tomorrow to resume  any blood thinners (aspirin, Plavix, Coumadin) but you may resume all your other medications today.  If Diabetic, monitor you glucose carefully due to steroids  used for this procedure    Seek Medical Attention if you have:  Severe pain or headache  Fever or chills  Redness or swelling around the injection site   Difficulty breathing  Vomiting or Increasing numbness or weakness in arms or legs

## 2019-09-20 NOTE — OP NOTE
PROCEDURE DATE: 9/20/2019    PROCEDURE:  Radiofrequency ablation of the C3,4,5,6 medial branch nerves on the right-side under fluoroscopy    DIAGNOSIS:  Other Cervical spondylosis  Post Op Diagnosis: Same    PHYSICIAN: Vasquez Polk MD    MEDICATIONS INJECTED:  From a mixture of 6ml of 0.25%bupivicaine and 80mg of methylprednisone, 1ml of this solution was injected at each level.    LOCAL ANESTHETIC USED:   1ml of lidocaine 1% at each level    SEDATION MEDICATIONS: RN IV sedation    ESTIMATED BLOOD LOSS:  None    COMPLICATIONS:  None    TECHNIQUE:   A time-out taken to identify patient and procedure side prior to starting the procedure.  The patient was positioned in the prone position. The patient was prepped and draped in the usual sterile fashion using ChloraPrep and sterile towels.  The levels were determined under fluoroscopic guidance using a slightly posteriorly oblique view.   Local anesthetic was infiltrated superficially at the skin.  Then a 100 mm 20g bent tip RF needle was inserted to the anatomic location of the midsection of the lateral masses of C3,4,5,6 on the right side(s). A cross table view was then taken to ensure that needles did not cross into neural foramina.  Impedance was less than 800 ohms at each level. Motor stimulation up to 2 volts confirmed there was no nerve root involvement at each level. Medication was then injected slowly.  Ablation was done per level utilizing radiofrequency generator at 80°C for 60 seconds. The patient tolerated the procedure well.     The patient was monitored after the procedure.  Patient was given post procedure and discharge instructions to follow at home.  The patient was discharged in a stable condition

## 2019-09-20 NOTE — H&P
CC: neck pain    HPI: The patient is a 74 y.o. female with a history of neck pain here for cervical MB RFA. There are no major changes in history and physical from 8/8/19 by myself.    Past Medical History:   Diagnosis Date    Anemia     Diabetes mellitus, type 2     Fracture of left foot     Hypertension     Osteoporosis        Past Surgical History:   Procedure Laterality Date    Block-nerve-medial branch-cervical Right 8/30/2019    Performed [facility] at [facility]    CERVICAL FUSION      gastric by pass      Injection,steroid,epidural,transforaminal approach C3-4 Right 7/3/2019    Performed by Vasquez Polk MD at [facility] OR    kidney stones         History reviewed. No pertinent family history.    Social History     Socioeconomic History    Marital status:      Spouse name: Not on file    Number of children: Not on file    Years of education: Not on file    Highest education level: Not on file   Occupational History    Not on file   Social Needs    Financial resource strain: Not on file    Food insecurity:     Worry: Not on file     Inability: Not on file    Transportation needs:     Medical: Not on file     Non-medical: Not on file   Tobacco Use    Smoking status: Never Smoker    Smokeless tobacco: Never Used   Substance and Sexual Activity    Alcohol use: Not on file    Drug use: Not on file    Sexual activity: Not on file   Lifestyle    Physical activity:     Days per week: Not on file     Minutes per session: Not on file    Stress: Not on file   Relationships    Social connections:     Talks on phone: Not on file     Gets together: Not on file     Attends [employer] service: Not on file     Active member of club or organization: Not on file     Attends meetings of clubs or organizations: Not on file     Relationship status: Not on file   Other Topics Concern    Not on file   Social History Narrative    Not on file       Current Facility-Administered Medications   Medication  "Dose Route Frequency Provider Last Rate Last Dose    lactated ringers infusion   Intravenous Continuous Vasquez Polk MD           Review of patient's allergies indicates:   Allergen Reactions    Penicillins Rash     started after taking 8-10 days of augmentin. She did have a reclast injection in the last 24 hours also but has never had problems with this.      Iodinated contrast media     Latex Rash       Vitals:    09/17/19 0945   Weight: 59.9 kg (132 lb 0.9 oz)   Height: 4' 11" (1.499 m)       REVIEW OF SYSTEMS:     GENERAL: No weight loss, malaise or fevers.  HEENT:  No recent changes in vision or hearing  NECK: Negative for lumps, no difficulty with swallowing.  RESPIRATORY: Negative for cough, wheezing or shortness of breath, patient denies any recent URI.  CARDIOVASCULAR: Negative for chest pain, leg swelling or palpitations.  GI: Negative for abdominal discomfort, blood in stools or black stools or change in bowel habits.  MUSCULOSKELETAL: See HPI.  SKIN: Negative for lesions, rash, and itching.  PSYCH: No suicidal or homicidal ideations, no current mood disturbances.  HEMATOLOGY/LYMPHOLOGY: Negative for prolonged bleeding, bruising easily or swollen nodes. Patient is not currently taking any anti-coagulants  ENDO: No history of diabetes or thyroid dysfunction  NEURO: No history of syncope, paralysis, seizures or tremors.All other reviewed and negative other than HPI.    Physical exam:  Gen: A and O x3, pleasant, well-groomed  Skin: No rashes or obvious lesions  HEENT: PERRLA, no obvious deformities on ears or in canals. No thyroid masses, trachea midline, no palpable lymph nodes in neck, axilla.  CVS: Regular rate and rhythm, normal S1 and S2, no murmurs.  Resp: Clear to auscultation bilaterally.  Abdomen: Soft, NT/ND, normal bowel sounds present.  Musculoskeletal/Neuro: Moving all extremities    Assessment:  Other spondylosis, cervical region  -     Case Request Operating Room: RADIOFREQUENCY THERMAL " COAGULATION-CERVICAL  -     Place in Outpatient; Standing  -     Vital signs; Standing  -     Place 18-22 gauage peripheral IV ; Standing  -     Verify informed consent; Standing  -     Notify physician ; Standing  -     Notify physician ; Standing  -     Notify physician (specify); Standing  -     Diet NPO; Standing    Other orders  -     lactated ringers infusion  -     IP VTE LOW RISK PATIENT; Standing          PLAN: Cervical MB RFA      This patient has been cleared for surgery in an ambulatory surgical facility    ASA 3,  Mallampatti Score 3  No history of anesthetic complications  Plan for RN IV sedation

## 2019-09-20 NOTE — DISCHARGE SUMMARY
[facility]  Discharge Note  Short Stay    Admit Date: 9/20/2019    Discharge Date and Time: 9/20/2019    Attending Physician: Vasquez Polk MD     Discharge Provider: Vasquez Polk    Diagnoses:  Active Hospital Problems    Diagnosis  POA    *Other spondylosis, cervical region [M47.892]  Yes      Resolved Hospital Problems   No resolved problems to display.       Hospital Course: Cervical MB RFA  Discharged Condition: Good    Final Diagnoses:   Active Hospital Problems    Diagnosis  POA    *Other spondylosis, cervical region [M47.892]  Yes      Resolved Hospital Problems   No resolved problems to display.       Disposition: Home or Self Care    Follow up/Patient Instructions:    Medications:  Reconciled Home Medications:      Medication List      CONTINUE taking these medications    aspirin 81 MG Chew  Take 1 tablet by mouth.     atorvastatin 80 MG tablet  Commonly known as:  LIPITOR     CALCITRATE-VITAMIN D 315 mg- 250 unit Tab  Generic drug:  calcium citrate-vitamin D3     cholecalciferol (vitamin D3) 2,000 unit Tab  Commonly known as:  VITAMIN D3     FREESTYLE LITE STRIPS Strp  Generic drug:  blood sugar diagnostic     glipiZIDE 2.5 MG Tr24  Commonly known as:  GLUCOTROL     JANUMET XR 50-1,000 mg Tm24  Generic drug:  SITagliptan-metformin     losartan 25 MG tablet  Commonly known as:  COZAAR     magnesium oxide 400 mg (241.3 mg magnesium) tablet  Commonly known as:  MAG-OX     omeprazole 20 MG capsule  Commonly known as:  PRILOSEC     RECLAST 5 mg/100 mL Pgbk  Generic drug:  zoledronic acid-mannitol & water  Inject 5 mg into the vein.     REFRESH OPTIVE SENSITIVE (PF) 0.5-0.9 % Dpet  Generic drug:  carboxymethylcell-glycerin(PF)     sertraline 100 MG tablet  Commonly known as:  ZOLOFT     traZODone 50 MG tablet  Commonly known as:  DESYREL     VITAMIN B-6 50 MG Tab  Generic drug:  pyridoxine (vitamin B6)          Discharge Procedure Orders   Call MD for:  temperature >100.4     Call MD for:  persistent  nausea and vomiting or diarrhea     Call MD for:  severe uncontrolled pain     Call MD for:  redness, tenderness, or signs of infection (pain, swelling, redness, odor or green/yellow discharge around incision site)     Call MD for:  difficulty breathing or increased cough     Call MD for:  severe persistent headache        Follow up with MD in 2-3 weeks    Discharge Procedure Orders (must include Diet, Follow-up, Activity):   Discharge Procedure Orders (must include Diet, Follow-up, Activity)   Call MD for:  temperature >100.4     Call MD for:  persistent nausea and vomiting or diarrhea     Call MD for:  severe uncontrolled pain     Call MD for:  redness, tenderness, or signs of infection (pain, swelling, redness, odor or green/yellow discharge around incision site)     Call MD for:  difficulty breathing or increased cough     Call MD for:  severe persistent headache

## 2019-09-20 NOTE — H&P (VIEW-ONLY)
CC: neck pain    HPI: The patient is a 74 y.o. female with a history of neck pain here for cervical MB RFA. There are no major changes in history and physical from 8/8/19 by myself.    Past Medical History:   Diagnosis Date    Anemia     Diabetes mellitus, type 2     Fracture of left foot     Hypertension     Osteoporosis        Past Surgical History:   Procedure Laterality Date    Block-nerve-medial branch-cervical Right 8/30/2019    [facility]    CERVICAL FUSION      gastric by pass      Injection,steroid,epidural,transforaminal approach C3-4 Right 7/3/2019    [facility]    kidney stones         History reviewed. No pertinent family history.    Social History     Socioeconomic History    Marital status:      Spouse name: Not on file    Number of children: Not on file    Years of education: Not on file    Highest education level: Not on file   Occupational History    Not on file   Social Needs    Financial resource strain: Not on file    Food insecurity:     Worry: Not on file     Inability: Not on file    Transportation needs:     Medical: Not on file     Non-medical: Not on file   Tobacco Use    Smoking status: Never Smoker    Smokeless tobacco: Never Used   Substance and Sexual Activity    Alcohol use: Not on file    Drug use: Not on file    Sexual activity: Not on file   Lifestyle    Physical activity:     Days per week: Not on file     Minutes per session: Not on file    Stress: Not on file   Relationships    Social connections:     Talks on phone: Not on file     Gets together: Not on file     Attends Latter day service: Not on file     Active member of club or organization: Not on file     Attends meetings of clubs or organizations: Not on file     Relationship status: Not on file   Other Topics Concern    Not on file   Social History Narrative    Not on file       Current Facility-Administered Medications   Medication  "Dose Route Frequency Provider Last Rate Last Dose    lactated ringers infusion   Intravenous Continuous Vasquez Polk MD           Review of patient's allergies indicates:   Allergen Reactions    Penicillins Rash     started after taking 8-10 days of augmentin. She did have a reclast injection in the last 24 hours also but has never had problems with this.      Iodinated contrast media     Latex Rash       Vitals:    09/17/19 0945   Weight: 59.9 kg (132 lb 0.9 oz)   Height: 4' 11" (1.499 m)       REVIEW OF SYSTEMS:     GENERAL: No weight loss, malaise or fevers.  HEENT:  No recent changes in vision or hearing  NECK: Negative for lumps, no difficulty with swallowing.  RESPIRATORY: Negative for cough, wheezing or shortness of breath, patient denies any recent URI.  CARDIOVASCULAR: Negative for chest pain, leg swelling or palpitations.  GI: Negative for abdominal discomfort, blood in stools or black stools or change in bowel habits.  MUSCULOSKELETAL: See HPI.  SKIN: Negative for lesions, rash, and itching.  PSYCH: No suicidal or homicidal ideations, no current mood disturbances.  HEMATOLOGY/LYMPHOLOGY: Negative for prolonged bleeding, bruising easily or swollen nodes. Patient is not currently taking any anti-coagulants  ENDO: No history of diabetes or thyroid dysfunction  NEURO: No history of syncope, paralysis, seizures or tremors.All other reviewed and negative other than HPI.    Physical exam:  Gen: A and O x3, pleasant, well-groomed  Skin: No rashes or obvious lesions  HEENT: PERRLA, no obvious deformities on ears or in canals. No thyroid masses, trachea midline, no palpable lymph nodes in neck, axilla.  CVS: Regular rate and rhythm, normal S1 and S2, no murmurs.  Resp: Clear to auscultation bilaterally.  Abdomen: Soft, NT/ND, normal bowel sounds present.  Musculoskeletal/Neuro: Moving all extremities    Assessment:  Other spondylosis, cervical region  -     Case Request Operating Room: RADIOFREQUENCY THERMAL " COAGULATION-CERVICAL  -     Place in Outpatient; Standing  -     Vital signs; Standing  -     Place 18-22 gauage peripheral IV ; Standing  -     Verify informed consent; Standing  -     Notify physician ; Standing  -     Notify physician ; Standing  -     Notify physician (specify); Standing  -     Diet NPO; Standing    Other orders  -     lactated ringers infusion  -     IP VTE LOW RISK PATIENT; Standing          PLAN: Cervical MB RFA      This patient has been cleared for surgery in an ambulatory surgical facility    ASA 3,  Mallampatti Score 3  No history of anesthetic complications  Plan for RN IV sedation

## 2019-09-23 VITALS
HEART RATE: 69 BPM | OXYGEN SATURATION: 95 % | SYSTOLIC BLOOD PRESSURE: 157 MMHG | BODY MASS INDEX: 26.62 KG/M2 | RESPIRATION RATE: 18 BRPM | TEMPERATURE: 97 F | HEIGHT: 59 IN | WEIGHT: 132.06 LBS | DIASTOLIC BLOOD PRESSURE: 113 MMHG

## 2019-09-30 ENCOUNTER — OFFICE VISIT (OUTPATIENT)
Dept: ORTHOPEDICS | Facility: CLINIC | Age: 74
End: 2019-09-30
Payer: MEDICARE

## 2019-09-30 ENCOUNTER — TELEPHONE (OUTPATIENT)
Dept: ORTHOPEDICS | Facility: CLINIC | Age: 74
End: 2019-09-30

## 2019-09-30 VITALS
HEIGHT: 59 IN | BODY MASS INDEX: 26.61 KG/M2 | HEART RATE: 69 BPM | SYSTOLIC BLOOD PRESSURE: 138 MMHG | DIASTOLIC BLOOD PRESSURE: 78 MMHG | WEIGHT: 132 LBS

## 2019-09-30 DIAGNOSIS — M47.816 LUMBAR FACET ARTHROPATHY: ICD-10-CM

## 2019-09-30 DIAGNOSIS — M75.41 IMPINGEMENT SYNDROME OF RIGHT SHOULDER: ICD-10-CM

## 2019-09-30 DIAGNOSIS — M51.36 DISC DEGENERATION, LUMBAR: ICD-10-CM

## 2019-09-30 DIAGNOSIS — M17.11 PRIMARY OSTEOARTHRITIS OF RIGHT KNEE: ICD-10-CM

## 2019-09-30 DIAGNOSIS — S92.355D CLOSED NONDISPLACED FRACTURE OF FIFTH METATARSAL BONE OF LEFT FOOT WITH ROUTINE HEALING, SUBSEQUENT ENCOUNTER: Primary | ICD-10-CM

## 2019-09-30 DIAGNOSIS — M47.896 OTHER SPONDYLOSIS, LUMBAR REGION: Primary | ICD-10-CM

## 2019-09-30 PROCEDURE — 99214 OFFICE O/P EST MOD 30 MIN: CPT | Mod: 25,S$GLB,, | Performed by: ORTHOPAEDIC SURGERY

## 2019-09-30 PROCEDURE — 99214 PR OFFICE/OUTPT VISIT, EST, LEVL IV, 30-39 MIN: ICD-10-PCS | Mod: 25,S$GLB,, | Performed by: ORTHOPAEDIC SURGERY

## 2019-09-30 PROCEDURE — 20610 DRAIN/INJ JOINT/BURSA W/O US: CPT | Mod: RT,S$GLB,, | Performed by: ORTHOPAEDIC SURGERY

## 2019-09-30 PROCEDURE — 20610 LARGE JOINT ASPIRATION/INJECTION: R KNEE: ICD-10-PCS | Mod: RT,S$GLB,, | Performed by: ORTHOPAEDIC SURGERY

## 2019-09-30 PROCEDURE — 20610 DRAIN/INJ JOINT/BURSA W/O US: CPT | Mod: 51,RT,S$GLB, | Performed by: ORTHOPAEDIC SURGERY

## 2019-09-30 RX ORDER — METHYLPREDNISOLONE ACETATE 40 MG/ML
40 INJECTION, SUSPENSION INTRA-ARTICULAR; INTRALESIONAL; INTRAMUSCULAR; SOFT TISSUE
Status: DISCONTINUED | OUTPATIENT
Start: 2019-09-30 | End: 2019-09-30 | Stop reason: HOSPADM

## 2019-09-30 RX ADMIN — METHYLPREDNISOLONE ACETATE 40 MG: 40 INJECTION, SUSPENSION INTRA-ARTICULAR; INTRALESIONAL; INTRAMUSCULAR; SOFT TISSUE at 09:09

## 2019-09-30 NOTE — H&P (VIEW-ONLY)
Subjective:       Patient ID: Madhuri Manuel is a 74 y.o. female.    Chief Complaint: Pain of the Left Foot (Left foot is doing good) and Pain of the Neck (Neck is good. She did see Dr Polk a week ago and that helped a lot but she states she has right shoulder, right hip and right knee pain)      History of Present Illness  Patient is here to follow-up with multiple orthopedic issues today. She was most recently seen for a left foot fifth metatarsal fracture which causes some occasional mild pain. Also she was seen for cervical facet arthropathy and neck pain. She was referred to Dr. Polk for cervical medial branch blocks with progression toward a rhizotomy. She did have this done and her neck pain is minimal. Unfortunately, however her chief complaint today is right lumbosacral pain, right knee pain, and right shoulder pain. She denies any acute injuries or incident that may have instigated her symptoms. The lumbar pain is a recurrence of previous symptoms that she had multiple years ago and that resolved with a lumbar facet rhizotomy.    Current Medications  Current Outpatient Medications   Medication Sig Dispense Refill    aspirin 81 MG Chew Take 1 tablet by mouth.      atorvastatin (LIPITOR) 80 MG tablet       CALCITRATE-VITAMIN D 315-250 mg-unit Tab       carboxymethylcell-glycerin,PF, (REFRESH OPTIVE SENSITIVE, PF,) 0.5-0.9 % Dpet       cholecalciferol, vitamin D3, (VITAMIN D3) 2,000 unit Tab       FREESTYLE LITE STRIPS Strp       glipiZIDE (GLUCOTROL) 2.5 MG TR24       JANUMET XR 50-1,000 mg TM24       losartan (COZAAR) 25 MG tablet       magnesium oxide (MAG-OX) 400 mg (241.3 mg magnesium) tablet       omeprazole (PRILOSEC) 20 MG capsule       sertraline (ZOLOFT) 100 MG tablet       traZODone (DESYREL) 50 MG tablet       VITAMIN B-6 50 MG tablet       zoledronic acid-mannitol & water (RECLAST) 5 mg/100 mL PgBk Inject 5 mg into the vein.       No current facility-administered medications for this  visit.        Allergies  Review of patient's allergies indicates:   Allergen Reactions    Iodinated contrast media Hives    Penicillins Rash     started after taking 8-10 days of augmentin. She did have a reclast injection in the last 24 hours also but has never had problems with this.      Latex Rash       Past Medical History  Past Medical History:   Diagnosis Date    Anemia     Diabetes mellitus, type 2     Fracture of left foot     Hypertension     Neck pain     Osteoporosis        Surgical History  Past Surgical History:   Procedure Laterality Date    CERVICAL FUSION      gastric by pass      INJECTION OF ANESTHETIC AGENT AROUND MEDIAL BRANCH NERVES INNERVATING CERVICAL FACET JOINT Right 8/30/2019    Procedure: Block-nerve-medial branch-cervical;  Surgeon: Vasquez Polk MD;  Location: [facility] OR;  Service: Pain Management;  Laterality: Right;  C3,4,5,6    kidney stones      RADIOFREQUENCY THERMOCOAGULATION Right 9/20/2019    Procedure: RADIOFREQUENCY THERMAL COAGULATION-CERVICAL;  Surgeon: Vasquez Polk MD;  Location: [facility] OR;  Service: Pain Management;  Laterality: Right;  CERVIAL C 3,4,5,6 -- Burned at 80 degrees C. for 60 seconds each site    TRANSFORAMINAL EPIDURAL INJECTION OF STEROID Right 7/3/2019    Procedure: Injection,steroid,epidural,transforaminal approach C3-4;  Surgeon: Vasquez Polk MD;  Location: [facility] OR;  Service: Pain Management;  Laterality: Right;       Family History:   History reviewed. No pertinent family history.    Social History:   Social History     Socioeconomic History    Marital status:      Spouse name: Not on file    Number of children: Not on file    Years of education: Not on file    Highest education level: Not on file   Occupational History    Not on file   Social Needs    Financial resource strain: Not on file    Food insecurity:     Worry: Not on file     Inability: Not on file    Transportation needs:     Medical: Not on file     Non-medical: Not on  file   Tobacco Use    Smoking status: Never Smoker    Smokeless tobacco: Never Used   Substance and Sexual Activity    Alcohol use: Never     Frequency: Never    Drug use: Not on file    Sexual activity: Not on file   Lifestyle    Physical activity:     Days per week: Not on file     Minutes per session: Not on file    Stress: Not on file   Relationships    Social connections:     Talks on phone: Not on file     Gets together: Not on file     Attends [employer] service: Not on file     Active member of club or organization: Not on file     Attends meetings of clubs or organizations: Not on file     Relationship status: Not on file   Other Topics Concern    Not on file   Social History Narrative    Not on file       Hospitalization/Major Diagnostic Procedure:     Review of Systems     General/Constitutional:  Chills denies. Fatigue denies. Fever denies. Weight gain denies. Weight loss denies.    Respiratory:  Shortness of breath denies.    Cardiovascular:  Chest pain denies.    Gastrointestinal:  Constipation denies. Diarrhea denies. Nausea denies. Vomiting denies.     Hematology:  Easy bruising denies. Prolonged bleeding denies.     Genitourinary:  Frequent urination denies. Pain in lower back denies. Painful urination denies.     Musculoskeletal:  See HPI for details    Skin:  Rash denies.    Neurologic:  Dizziness denies. Gait abnormalities denies. Seizures denies. Tingling/Numbess denies.    Psychiatric:  Anxiety denies. Depressed mood denies.     Objective:   Vital Signs:   Vitals:    09/30/19 0923   BP: 138/78   Pulse: 69        Physical Exam      General Examination:     Constitutional: The patient is alert and oriented to lace person and time. Mood is pleasant.     Head/Face: Normal facial features normal eyebrows    Eyes: Normal extraocular motion bilaterally    Lungs: Respirations are equal and unlabored    Gait is coordinated.    Cardiovascular: There are no swelling or varicosities  "present.    Lymphatic: Negative for adenopathy    Skin: Normal    Neurological: Level of consciousness normal. Oriented to place person and time and situation    Psychiatric: Oriented to time place person and situation    Lumbar exam: Mild antalgic gait tenderness palpation over the right lumbosacral region and SI joint. Limited lumbar and range flexion and extension secondary to pain and guarding. Bilateral lower extremities a distal neurovascular intact. The right knee does have some medial joint line tenderness palpation. No significant effusion. No ligamentous instability. The right shoulder demonstrates decreased range of motion in all planes mobility secondary to pain. Positive Neer's and positive Weeks.    XRAY Report/ Interpretation: AP, lateral, and oblique x-rays of the left foot demonstrate that the fifth metatarsal fracture is healed. Right knee AP and lateral x-rays taken in the office today and reviewed the patient demonstrate only mild degenerative changes. Right shoulder AP and axillary views taken in the office today and reviewed with the patient demonstrates only mild degenerative changes      Assessment:       1. Closed nondisplaced fracture of fifth metatarsal bone of right foot with routine healing, subsequent encounter    2. Disc degeneration, lumbar    3. Lumbar facet arthropathy    4. Primary osteoarthritis of right knee    5. Impingement syndrome of right shoulder        Plan:       Madhuri was seen today for pain and pain.    Diagnoses and all orders for this visit:    Closed nondisplaced fracture of fifth metatarsal bone of right foot with routine healing, subsequent encounter  -     X-Ray Foot Complete Right    Disc degeneration, lumbar    Lumbar facet arthropathy    Shoulder impingement syndrome, left    Primary osteoarthritis of left knee         No follow-ups on file.  Sukh Moscoso, physician's assistant served in the capacity as a "scribe" for this patient encounter  A "face to " "face" encounter occurred with Dr. Diaz on this date  The treatment plan and medical decision making is outlined below:  Regarding the lumbar spine we recommend repeating right L5 and S1 medial branch blocks ×2 with progression toward a rhizotomy. I do know that she has had the rhizotomy in the past and did well. However this was multiple years ago and therefore that's why I am recommending the repeat of the medial branch blocks prior to the rhizotomy. Regarding the right knee pain she was given a right knee intra-articular injection with 2 cc lidocaine and 40 mg of Depo-Medrol. Please see procedure report for details. She was given the same injection for her right shoulder impingement. Up in 8 weeks or sooner if needed.    This note was created using Dragon voice recognition software that occasionally misinterpreted phrases or words.  "

## 2019-09-30 NOTE — PROCEDURES
Large Joint Aspiration/Injection: R knee  Date/Time: 9/30/2019 9:15 AM  Performed by: Wilver Diaz MD  Authorized by: Wilver Diaz MD     Consent Done?:  Yes (Verbal)  Indications:  Pain  Procedure site marked: Yes    Timeout: Prior to procedure the correct patient, procedure, and site was verified    Anesthesia  Local anesthesia used  Anesthesia: local infiltration  Anesthetic: lidocaine 1% without epinephrine    Location:  Knee  Site:  R knee  Prep: Patient was prepped and draped in usual sterile fashion    Needle size:  25 G  Medications:  40 mg methylPREDNISolone acetate 40 mg/mL  Patient tolerance:  Patient tolerated the procedure well with no immediate complications  Large Joint Aspiration/Injection: R subacromial bursa  Date/Time: 9/30/2019 9:15 AM  Performed by: Wilver Diaz MD  Authorized by: Wilver Diaz MD     Consent Done?:  Yes (Verbal)  Indications:  Pain  Procedure site marked: Yes    Timeout: Prior to procedure the correct patient, procedure, and site was verified    Anesthesia  Local anesthesia used  Anesthesia: local infiltration  Anesthetic: lidocaine 1% without epinephrine    Location:  Shoulder  Site:  R subacromial bursa  Prep: Patient was prepped and draped in usual sterile fashion    Needle size:  25 G  Medications:  40 mg methylPREDNISolone acetate 40 mg/mL  Patient tolerance:  Patient tolerated the procedure well with no immediate complications

## 2019-09-30 NOTE — PROGRESS NOTES
Subjective:       Patient ID: Madhuri Manuel is a 74 y.o. female.    Chief Complaint: Pain of the Left Foot (Left foot is doing good) and Pain of the Neck (Neck is good. She did see Dr Polk a week ago and that helped a lot but she states she has right shoulder, right hip and right knee pain)      History of Present Illness  Patient is here to follow-up with multiple orthopedic issues today. She was most recently seen for a left foot fifth metatarsal fracture which causes some occasional mild pain. Also she was seen for cervical facet arthropathy and neck pain. She was referred to Dr. Polk for cervical medial branch blocks with progression toward a rhizotomy. She did have this done and her neck pain is minimal. Unfortunately, however her chief complaint today is right lumbosacral pain, right knee pain, and right shoulder pain. She denies any acute injuries or incident that may have instigated her symptoms. The lumbar pain is a recurrence of previous symptoms that she had multiple years ago and that resolved with a lumbar facet rhizotomy.    Current Medications  Current Outpatient Medications   Medication Sig Dispense Refill    aspirin 81 MG Chew Take 1 tablet by mouth.      atorvastatin (LIPITOR) 80 MG tablet       CALCITRATE-VITAMIN D 315-250 mg-unit Tab       carboxymethylcell-glycerin,PF, (REFRESH OPTIVE SENSITIVE, PF,) 0.5-0.9 % Dpet       cholecalciferol, vitamin D3, (VITAMIN D3) 2,000 unit Tab       FREESTYLE LITE STRIPS Strp       glipiZIDE (GLUCOTROL) 2.5 MG TR24       JANUMET XR 50-1,000 mg TM24       losartan (COZAAR) 25 MG tablet       magnesium oxide (MAG-OX) 400 mg (241.3 mg magnesium) tablet       omeprazole (PRILOSEC) 20 MG capsule       sertraline (ZOLOFT) 100 MG tablet       traZODone (DESYREL) 50 MG tablet       VITAMIN B-6 50 MG tablet       zoledronic acid-mannitol & water (RECLAST) 5 mg/100 mL PgBk Inject 5 mg into the vein.      calcium-vitamin D 600 mg(1,500mg) -400 unit Tab        PAZEO 0.7 % Drop       REFRESH TEARS 0.5 % Drop        No current facility-administered medications for this visit.        Allergies  Review of patient's allergies indicates:   Allergen Reactions    Iodinated contrast media Hives    Penicillins Rash     started after taking 8-10 days of augmentin. She did have a reclast injection in the last 24 hours also but has never had problems with this.      Latex Rash       Past Medical History  Past Medical History:   Diagnosis Date    Anemia     Diabetes mellitus, type 2     Fracture of left foot     Hypertension     Neck pain     Osteoporosis        Surgical History  Past Surgical History:   Procedure Laterality Date    CERVICAL FUSION      gastric by pass      INJECTION OF ANESTHETIC AGENT AROUND MEDIAL BRANCH NERVES INNERVATING CERVICAL FACET JOINT Right 8/30/2019    Procedure: Block-nerve-medial branch-cervical;  Surgeon: Vasquez Polk MD;  Location: [facility] OR;  Service: Pain Management;  Laterality: Right;  C3,4,5,6    INJECTION OF ANESTHETIC AGENT AROUND MEDIAL BRANCH NERVES INNERVATING LUMBAR FACET JOINT Right 10/3/2019    Procedure: Block-nerve-medial branch-lumbar;  Surgeon: Vasquez Polk MD;  Location: [facility] OR;  Service: Pain Management;  Laterality: Right;  L4, L5    kidney stones      RADIOFREQUENCY ABLATION OF LUMBAR MEDIAL BRANCH NERVE AT SINGLE LEVEL Right 10/23/2019    Procedure: Radiofrequency Ablation, Nerve, Spinal, Lumbar, Medial Branch, L4,5;  Surgeon: Vasquez Polk MD;  Location: [facility] OR;  Service: Pain Management;  Laterality: Right;  Burned at 80 degrees C X 60 seconds X 2 each site    RADIOFREQUENCY THERMOCOAGULATION Right 9/20/2019    Procedure: RADIOFREQUENCY THERMAL COAGULATION-CERVICAL;  Surgeon: Vasquez Polk MD;  Location: [facility] OR;  Service: Pain Management;  Laterality: Right;  CERVIAL C 3,4,5,6 -- Burned at 80 degrees C. for 60 seconds each site    TRANSFORAMINAL EPIDURAL INJECTION OF STEROID Right 7/3/2019    Procedure:  Injection,steroid,epidural,transforaminal approach C3-4;  Surgeon: Vasquez Polk MD;  Location: [facility] OR;  Service: Pain Management;  Laterality: Right;       Family History:   History reviewed. No pertinent family history.    Social History:   Social History     Socioeconomic History    Marital status:      Spouse name: Not on file    Number of children: Not on file    Years of education: Not on file    Highest education level: Not on file   Occupational History    Not on file   Social Needs    Financial resource strain: Not on file    Food insecurity:     Worry: Not on file     Inability: Not on file    Transportation needs:     Medical: Not on file     Non-medical: Not on file   Tobacco Use    Smoking status: Never Smoker    Smokeless tobacco: Never Used   Substance and Sexual Activity    Alcohol use: Never     Frequency: Never    Drug use: Not on file    Sexual activity: Not on file   Lifestyle    Physical activity:     Days per week: Not on file     Minutes per session: Not on file    Stress: Not on file   Relationships    Social connections:     Talks on phone: Not on file     Gets together: Not on file     Attends Buddhism service: Not on file     Active member of club or organization: Not on file     Attends meetings of clubs or organizations: Not on file     Relationship status: Not on file   Other Topics Concern    Not on file   Social History Narrative    Not on file       Hospitalization/Major Diagnostic Procedure:     Review of Systems     General/Constitutional:  Chills denies. Fatigue denies. Fever denies. Weight gain denies. Weight loss denies.    Respiratory:  Shortness of breath denies.    Cardiovascular:  Chest pain denies.    Gastrointestinal:  Constipation denies. Diarrhea denies. Nausea denies. Vomiting denies.     Hematology:  Easy bruising denies. Prolonged bleeding denies.     Genitourinary:  Frequent urination denies. Pain in lower back denies. Painful urination  denies.     Musculoskeletal:  See HPI for details    Skin:  Rash denies.    Neurologic:  Dizziness denies. Gait abnormalities denies. Seizures denies. Tingling/Numbess denies.    Psychiatric:  Anxiety denies. Depressed mood denies.     Objective:   Vital Signs:   Vitals:    09/30/19 0923   BP: 138/78   Pulse: 69        Physical Exam      General Examination:     Constitutional: The patient is alert and oriented to lace person and time. Mood is pleasant.     Head/Face: Normal facial features normal eyebrows    Eyes: Normal extraocular motion bilaterally    Lungs: Respirations are equal and unlabored    Gait is coordinated.    Cardiovascular: There are no swelling or varicosities present.    Lymphatic: Negative for adenopathy    Skin: Normal    Neurological: Level of consciousness normal. Oriented to place person and time and situation    Psychiatric: Oriented to time place person and situation    Lumbar exam: Mild antalgic gait tenderness palpation over the right lumbosacral region and SI joint. Limited lumbar and range flexion and extension secondary to pain and guarding. Bilateral lower extremities a distal neurovascular intact. The right knee does have some medial joint line tenderness palpation. No significant effusion. No ligamentous instability. The right shoulder demonstrates decreased range of motion in all planes mobility secondary to pain. Positive Neer's and positive Weeks.    XRAY Report/ Interpretation: AP, lateral, and oblique x-rays of the left foot demonstrate that the fifth metatarsal fracture is healed. Right knee AP and lateral x-rays taken in the office today and reviewed the patient demonstrate only mild degenerative changes. Right shoulder AP and axillary views taken in the office today and reviewed with the patient demonstrates only mild degenerative changes      Assessment:       1. Closed nondisplaced fracture of fifth metatarsal bone of left foot with routine healing, subsequent encounter   "  2. Disc degeneration, lumbar    3. Lumbar facet arthropathy    4. Primary osteoarthritis of right knee    5. Impingement syndrome of right shoulder        Plan:       Madhuri was seen today for pain and pain.    Diagnoses and all orders for this visit:    Closed nondisplaced fracture of fifth metatarsal bone of left foot with routine healing, subsequent encounter  -     X-Ray Foot Complete Right  -     X-Ray Foot Complete Left    Disc degeneration, lumbar    Lumbar facet arthropathy    Primary osteoarthritis of right knee  -     Large Joint Aspiration/Injection: R knee  -     X-Ray Knee 1 or 2 View Right  -     Discontinue: methylPREDNISolone acetate injection 40 mg    Impingement syndrome of right shoulder  -     Large Joint Aspiration/Injection: R subacromial bursa  -     X-ray Shoulder 2 or More Views Right  -     Discontinue: methylPREDNISolone acetate injection 40 mg         Follow up in about 8 weeks (around 11/25/2019).  Sukh Moscoso, physician's assistant served in the capacity as a "scribe" for this patient encounter  A "face to face" encounter occurred with Dr. Diaz on this date  The treatment plan and medical decision making is outlined below:  Regarding the lumbar spine we recommend repeating right L5 and S1 medial branch blocks ×2 with progression toward a rhizotomy. I do know that she has had the rhizotomy in the past and did well. However this was multiple years ago and therefore that's why I am recommending the repeat of the medial branch blocks prior to the rhizotomy. Regarding the right knee pain she was given a right knee intra-articular injection with 2 cc lidocaine and 40 mg of Depo-Medrol. Please see procedure report for details. She was given the same injection for her right shoulder impingement. Up in 8 weeks or sooner if needed.  As for her left foot fracture we will treat conservatively.   This note was created using Dragon voice recognition software that occasionally misinterpreted " phrases or words.

## 2019-10-03 ENCOUNTER — HOSPITAL ENCOUNTER (OUTPATIENT)
Facility: AMBULARY SURGERY CENTER | Age: 74
Discharge: HOME | End: 2019-10-03
Attending: ANESTHESIOLOGY | Admitting: ANESTHESIOLOGY
Payer: MEDICARE

## 2019-10-03 VITALS
SYSTOLIC BLOOD PRESSURE: 138 MMHG | TEMPERATURE: 98 F | HEART RATE: 66 BPM | BODY MASS INDEX: 26.61 KG/M2 | DIASTOLIC BLOOD PRESSURE: 67 MMHG | RESPIRATION RATE: 18 BRPM | HEIGHT: 59 IN | OXYGEN SATURATION: 97 % | WEIGHT: 132 LBS

## 2019-10-03 DIAGNOSIS — M47.896 OTHER SPONDYLOSIS, LUMBAR REGION: Primary | ICD-10-CM

## 2019-10-03 LAB — POCT GLUCOSE: 113 MG/DL (ref 70–110)

## 2019-10-03 PROCEDURE — 64493 PR INJ DX/THER AGNT PARAVERT FACET JOINT,IMG GUIDE,LUMBAR/SAC,1ST LVL: ICD-10-PCS | Mod: RT,,, | Performed by: ANESTHESIOLOGY

## 2019-10-03 PROCEDURE — 64493 INJ PARAVERT F JNT L/S 1 LEV: CPT | Mod: RT,,, | Performed by: ANESTHESIOLOGY

## 2019-10-03 PROCEDURE — 99152 PR MOD CONSCIOUS SEDATION, SAME PHYS, 5+ YRS, FIRST 15 MIN: ICD-10-PCS | Mod: ,,, | Performed by: ANESTHESIOLOGY

## 2019-10-03 PROCEDURE — 99152 MOD SED SAME PHYS/QHP 5/>YRS: CPT | Mod: ,,, | Performed by: ANESTHESIOLOGY

## 2019-10-03 PROCEDURE — 64493 INJ PARAVERT F JNT L/S 1 LEV: CPT | Performed by: ANESTHESIOLOGY

## 2019-10-03 RX ORDER — MIDAZOLAM HYDROCHLORIDE 2 MG/2ML
INJECTION, SOLUTION INTRAMUSCULAR; INTRAVENOUS
Status: DISCONTINUED | OUTPATIENT
Start: 2019-10-03 | End: 2019-10-03 | Stop reason: HOSPADM

## 2019-10-03 RX ORDER — SODIUM CHLORIDE, SODIUM LACTATE, POTASSIUM CHLORIDE, CALCIUM CHLORIDE 600; 310; 30; 20 MG/100ML; MG/100ML; MG/100ML; MG/100ML
INJECTION, SOLUTION INTRAVENOUS ONCE AS NEEDED
Status: COMPLETED | OUTPATIENT
Start: 2019-10-03 | End: 2019-10-03

## 2019-10-03 RX ORDER — BUPIVACAINE HYDROCHLORIDE 5 MG/ML
INJECTION, SOLUTION EPIDURAL; INTRACAUDAL
Status: DISCONTINUED | OUTPATIENT
Start: 2019-10-03 | End: 2019-10-03 | Stop reason: HOSPADM

## 2019-10-03 RX ADMIN — SODIUM CHLORIDE, SODIUM LACTATE, POTASSIUM CHLORIDE, CALCIUM CHLORIDE: 600; 310; 30; 20 INJECTION, SOLUTION INTRAVENOUS at 12:10

## 2019-10-03 NOTE — DISCHARGE INSTRUCTIONS
Before leaving, please make sure you have all your personal belongings such as glasses, purses, wallets, keys, cell phones, jewelry, jackets etc   Recovery After Procedural Sedation (Adult)  You have been given medicine by vein to make you sleep during your surgery. This may have included both a pain medicine and sleeping medicine. Most of the effects have worn off. But you may still have some drowsiness for the next 6 to 8 hours.  Home care  Follow these guidelines when you get home:  · For the next 8 hours, you should be watched by a responsible adult. This person should make sure your condition is not getting worse.  · Don't drink any alcohol for the next 24 hours.  · Don't drive, operate dangerous machinery, or make important business or personal decisions during the next 24 hours.  Note: Your healthcare provider may tell you not to take any medicine by mouth for pain or sleep in the next 4 hours. These medicines may react with the medicines you were given in the hospital. This could cause a much stronger response than usual.  Follow-up care  Follow up with your healthcare provider if you are not alert and back to your usual level of activity within 12 hours.  When to seek medical advice  Call your healthcare provider right away if any of these occur:  · Drowsiness gets worse  · Weakness or dizziness gets worse  · Repeated vomiting  · You can't be awakened   Date Last Reviewed: 10/18/2016  © 9313-9577 [employer]. [address] [place] [id number]. All rights reserved. This information is not intended as a substitute for professional medical care. Always follow your healthcare professional's instructions.     NERVE BLOCK INSTRUCTIONS:    You may get some pain relief from the local anesthetic initally.    No driving for 24 hrs.   Activity as tolerated- gradually increase activities.  Dont lift over 10 lbs for 24 hrs   No heat at injection sites x 2 days. No heating pads, hot tubs, saunas, or  swimming in any body of water or pool for 2 days.  Use ice pack for mild swelling and for comfort , apply for 20 minutes, remove for 20 minute intervals. No direct contact of ice itself  to skin.  May shower today. Do not allow shower water to beat on injection sites for 2 days. No tub baths for two days.      Resume Aspirin, Plavix, or Coumadin the day after the procedure unless otherwise instructed.   If diabetic,monitor your glucose carefully as steroids can increase your glucose level    Seek immediate medical help for:   Severe increase in your usual pain or appearance of new pain.  Prolonged (more than 8 hours) or increasing weakness or numbness in the legs or arms. Numbing medicine was injected and can affect the messages to and from the brain and legs or arms.  .    Fever above 100.4degrees F ,Drainage,redness,active bleeding, or increased swelling at the injection site.  Headache, shortness of breath, chest pain, or breathing problems.      Nerve Block  This was a test, not a treatment. Your pain may return.  Keep your pain journal Dr office will call to check your pain levels within 3 days   Perform activities that normally cause you pain during this testing period    Home care instructions  You may apply ice pack to the injection site for 2 days , NO HEAT for 2 days  You may take a shower but no soaking above level of injection in tub or pool for 2 days  Resume Aspirin, Plavix, or Coumadin the day after the procedure unless otherwise instructed.  Do not drive for 24 hr      SEEK  IMMEDIATE MEDICAL HELP FOR:  Severe increase in your usual pain or appearance of new pain  Prolonged  ( more than 8 hours)or increasing weakness or numbness in the legs or arms  Drainage, redness, active bleeding, or increased swelling at the injection site  Temperature over 100.0 degrees F.  Headache that increases when your head is upright and decreases when you lie flat  Shortness of breath, chest pain, or problems breathing

## 2019-10-03 NOTE — OP NOTE
PROCEDURE DATE: 10/3/2019    PROCEDURE:  Right L4 and L5 medial branch nerve blocks (corresponds to right L5/S1 facet)    DIAGNOSIS:  Other lumbar spondylosis    Post Op diagnosis: Same    PHYSICIAN: Vasquez Polk MD    MEDICATIONS INJECTED: 0.5% bupivicaine, 0.5 ml at each level    SEDATION MEDICATIONS:RN IV Versed    LOCAL ANESTHETIC USED: None    ESTIMATED BLOOD LOSS:  None    COMPLICATIONS:  None    TECHNIQUE: A time out was taken to identify the patient, procedure and side of the procedure. The patient was placed in a prone position, then prepped and draped in the usual sterile fashion using ChloraPrep and sterile towels.  The levels were determined under fluoroscopic guidance and then marked.  A 25-gauge 3.5 inch needle was introduced to the anatomic location of the L4,L5 medial branch nerves on the right side. The above medication was then injected. The patient tolerated the procedure well.     The patient was monitored after the procedure. Patient was given pain diary to record pain levels at home.     If found to have greater than a 50% recovery and so will be scheduled for a radiofrequency ablation of the corresponding nerves.  Patient was given post procedure and discharge instructions to follow at home.  The patient was discharged in a stable condition.

## 2019-10-03 NOTE — DISCHARGE SUMMARY
[facility]  Discharge Note  Short Stay    Admit Date: 10/3/2019    Discharge Date and Time: 10/3/2019    Attending Physician: Vasquez Polk MD     Discharge Provider: Vasquez Polk    Diagnoses:  Active Hospital Problems    Diagnosis  POA    *Other spondylosis, lumbar region [M47.896]  Yes      Resolved Hospital Problems   No resolved problems to display.       Hospital Course: Lumbar MBB  Discharged Condition: Good    Final Diagnoses:   Active Hospital Problems    Diagnosis  POA    *Other spondylosis, lumbar region [M47.896]  Yes      Resolved Hospital Problems   No resolved problems to display.       Disposition: Home or Self Care    Follow up/Patient Instructions:    Medications:  Reconciled Home Medications:      Medication List      CONTINUE taking these medications    aspirin 81 MG Chew  Take 1 tablet by mouth.     atorvastatin 80 MG tablet  Commonly known as:  LIPITOR     CALCITRATE-VITAMIN D 315 mg- 250 unit Tab  Generic drug:  calcium citrate-vitamin D3     cholecalciferol (vitamin D3) 2,000 unit Tab  Commonly known as:  VITAMIN D3     FREESTYLE LITE STRIPS Strp  Generic drug:  blood sugar diagnostic     glipiZIDE 2.5 MG Tr24  Commonly known as:  GLUCOTROL     JANUMET XR 50-1,000 mg Tm24  Generic drug:  SITagliptan-metformin     losartan 25 MG tablet  Commonly known as:  COZAAR     magnesium oxide 400 mg (241.3 mg magnesium) tablet  Commonly known as:  MAG-OX     omeprazole 20 MG capsule  Commonly known as:  PRILOSEC     RECLAST 5 mg/100 mL Pgbk  Generic drug:  zoledronic acid-mannitol & water  Inject 5 mg into the vein.     REFRESH OPTIVE SENSITIVE (PF) 0.5-0.9 % Dpet  Generic drug:  carboxymethylcell-glycerin(PF)     sertraline 100 MG tablet  Commonly known as:  ZOLOFT     traZODone 50 MG tablet  Commonly known as:  DESYREL     VITAMIN B-6 50 MG Tab  Generic drug:  pyridoxine (vitamin B6)          Discharge Procedure Orders   Call MD for:  temperature >100.4     Call MD for:  persistent nausea and  vomiting or diarrhea     Call MD for:  severe uncontrolled pain     Call MD for:  redness, tenderness, or signs of infection (pain, swelling, redness, odor or green/yellow discharge around incision site)     Call MD for:  difficulty breathing or increased cough     Call MD for:  severe persistent headache        Follow up with MD in 2-3 weeks    Discharge Procedure Orders (must include Diet, Follow-up, Activity):   Discharge Procedure Orders (must include Diet, Follow-up, Activity)   Call MD for:  temperature >100.4     Call MD for:  persistent nausea and vomiting or diarrhea     Call MD for:  severe uncontrolled pain     Call MD for:  redness, tenderness, or signs of infection (pain, swelling, redness, odor or green/yellow discharge around incision site)     Call MD for:  difficulty breathing or increased cough     Call MD for:  severe persistent headache

## 2019-10-08 ENCOUNTER — TELEPHONE (OUTPATIENT)
Dept: PAIN MEDICINE | Facility: CLINIC | Age: 74
End: 2019-10-08

## 2019-10-08 DIAGNOSIS — M47.896 OTHER SPONDYLOSIS, LUMBAR REGION: Primary | ICD-10-CM

## 2019-10-08 NOTE — TELEPHONE ENCOUNTER
Spoke to pt states she received an 80% or greater decrease in pain following MBB Right Lumbar L4,L5. Pt would like to continue on to receive the RFA. Pt scheduled RFA Right Lumbar L4,L5 for 10/23/19

## 2019-10-23 ENCOUNTER — HOSPITAL ENCOUNTER (OUTPATIENT)
Facility: AMBULARY SURGERY CENTER | Age: 74
Discharge: HOME | End: 2019-10-23
Attending: ANESTHESIOLOGY | Admitting: ANESTHESIOLOGY
Payer: MEDICARE

## 2019-10-23 DIAGNOSIS — M47.896 OTHER SPONDYLOSIS, LUMBAR REGION: Primary | ICD-10-CM

## 2019-10-23 LAB — POCT GLUCOSE: 130 MG/DL (ref 70–110)

## 2019-10-23 PROCEDURE — 64636 DESTROY L/S FACET JNT ADDL: CPT | Performed by: ANESTHESIOLOGY

## 2019-10-23 PROCEDURE — 64635 DESTROY LUMB/SAC FACET JNT: CPT | Mod: RT,,, | Performed by: ANESTHESIOLOGY

## 2019-10-23 PROCEDURE — 64635 DESTROY LUMB/SAC FACET JNT: CPT | Performed by: ANESTHESIOLOGY

## 2019-10-23 PROCEDURE — 99152 PR MOD CONSCIOUS SEDATION, SAME PHYS, 5+ YRS, FIRST 15 MIN: ICD-10-PCS | Mod: ,,, | Performed by: ANESTHESIOLOGY

## 2019-10-23 PROCEDURE — 64635 PR DESTROY LUMB/SAC FACET JNT: ICD-10-PCS | Mod: RT,,, | Performed by: ANESTHESIOLOGY

## 2019-10-23 PROCEDURE — 99152 MOD SED SAME PHYS/QHP 5/>YRS: CPT | Mod: ,,, | Performed by: ANESTHESIOLOGY

## 2019-10-23 RX ORDER — SODIUM CHLORIDE, SODIUM LACTATE, POTASSIUM CHLORIDE, CALCIUM CHLORIDE 600; 310; 30; 20 MG/100ML; MG/100ML; MG/100ML; MG/100ML
INJECTION, SOLUTION INTRAVENOUS ONCE AS NEEDED
Status: COMPLETED | OUTPATIENT
Start: 2019-10-23 | End: 2019-10-23

## 2019-10-23 RX ORDER — LIDOCAINE HYDROCHLORIDE 10 MG/ML
INJECTION, SOLUTION EPIDURAL; INFILTRATION; INTRACAUDAL; PERINEURAL
Status: DISCONTINUED
Start: 2019-10-23 | End: 2019-10-23 | Stop reason: HOSPADM

## 2019-10-23 RX ORDER — BUPIVACAINE HYDROCHLORIDE 2.5 MG/ML
INJECTION, SOLUTION EPIDURAL; INFILTRATION; INTRACAUDAL
Status: DISCONTINUED | OUTPATIENT
Start: 2019-10-23 | End: 2019-10-23 | Stop reason: HOSPADM

## 2019-10-23 RX ORDER — MIDAZOLAM HYDROCHLORIDE 2 MG/2ML
INJECTION, SOLUTION INTRAMUSCULAR; INTRAVENOUS
Status: DISCONTINUED | OUTPATIENT
Start: 2019-10-23 | End: 2019-10-23 | Stop reason: HOSPADM

## 2019-10-23 RX ORDER — METHYLPREDNISOLONE ACETATE 80 MG/ML
INJECTION, SUSPENSION INTRA-ARTICULAR; INTRALESIONAL; INTRAMUSCULAR; SOFT TISSUE
Status: DISCONTINUED | OUTPATIENT
Start: 2019-10-23 | End: 2019-10-23 | Stop reason: HOSPADM

## 2019-10-23 RX ORDER — LIDOCAINE HYDROCHLORIDE 20 MG/ML
INJECTION, SOLUTION EPIDURAL; INFILTRATION; INTRACAUDAL; PERINEURAL
Status: DISCONTINUED | OUTPATIENT
Start: 2019-10-23 | End: 2019-10-23 | Stop reason: HOSPADM

## 2019-10-23 RX ORDER — FENTANYL CITRATE 50 UG/ML
INJECTION, SOLUTION INTRAMUSCULAR; INTRAVENOUS
Status: DISCONTINUED | OUTPATIENT
Start: 2019-10-23 | End: 2019-10-23 | Stop reason: HOSPADM

## 2019-10-23 RX ORDER — LIDOCAINE HYDROCHLORIDE 10 MG/ML
INJECTION, SOLUTION EPIDURAL; INFILTRATION; INTRACAUDAL; PERINEURAL
Status: DISCONTINUED | OUTPATIENT
Start: 2019-10-23 | End: 2019-10-23 | Stop reason: HOSPADM

## 2019-10-23 RX ADMIN — SODIUM CHLORIDE, SODIUM LACTATE, POTASSIUM CHLORIDE, CALCIUM CHLORIDE: 600; 310; 30; 20 INJECTION, SOLUTION INTRAVENOUS at 12:10

## 2019-10-23 NOTE — OP NOTE
PROCEDURE DATE: 10/23/2019    PROCEDURE:  Radiofrequency ablation of the L4,5 medial branch nerves on the right-side utilizing fluoroscopy (corresponds to right L5-S1 facet)    DIAGNOSIS:  Other lumbar spondylosis  Post op Diagnosis: Same    PHYSICIAN: Vasquez Polk MD    MEDICATIONS INJECTED:  From a mixture of 6ml of 0.25% bupivicaine and 80mg of methylprednisone,  1ml of this solution was injected at each level.    LOCAL ANESTHETIC USED: Lidocaine 1%, 2 ml given at each site.    SEDATION MEDICATIONS: RN IV sedation    ESTIMATED BLOOD LOSS:  None    COMPLICATIONS:  NOne    TECHNIQUE:  A time out was taken to identify patient and procedure side prior to starting the procedure. Laying in a prone position, the patient was prepped and draped in the usual sterile fashion using ChloraPrep and sterile towels.  The levels were determined under fluoroscopic guidance and then marked.  Local anesthetic was given by raising a wheal at the skin over each site and then infiltrated approximately 2cm deeper.  A 20-gauge  100 mm RF needle was introduced to the anatomic location of the right L4 and L5 medial branch nerves.  Motor stimulation up to 2 Volts at each level confirmed no motor nerve involvement.  Impedance was less than 800 ohms at each level.  1ml of 2% lidocaine was instilled prior to lesioning.  Ablation was performed per level utilizing radiofrequency generator 80°C for 60 seconds.  Needles were then rotated 90° and a second lesion was performed.  The above noted medication was then injected slowly. The patient tolerated the procedure well.     The patient was monitored after the procedure.  Patient was given post procedure and discharge instructions to follow at home.  The patient was discharged in a stable condition

## 2019-10-23 NOTE — DISCHARGE INSTRUCTIONS
Anesthesia information    Anesthesia Safety      You have been given medicine  to sedate you during your procedure today. This may have included both a pain medicine and sleeping medicine. Most of the effects have worn off; however, you may continue to have some drowsiness for the next  24 hours. Anesthesia and pain medicines can cause nausea, sleepiness, dizziness and  constipation.    HOME CARE:  1) For the next EIGHT HOURS, you should be watched by a responsible adult to look for any worsening of your condition.  2) DO NOT DRINK any ALCOHOL for the next 24 HOURS.  3) DO NOT DRIVE or operate dangerous machinery during the next 24 HOURS.  FOLLOW UP with your doctor or this facility if you are not alert and back to your usual level of activity within 24 hrs.  GET PROMPT MEDICAL ATTENTION if any of the following occur:  -- Increased drowsiness  -- Increased weakness or dizziness  -- Repeated vomiting  -- If you cannot be awakened    Before leaving, please make sure you have all your personal belongings such as glasses, purses, wallets, keys, cell phones, jewelry, jackets etc  Radiofrequency of Nerves    After this procedure you may have increased pain for three days and lingering pain for up to 6 weeks.   Most patients feel better after 4-6  weeks.    Use your pain medications as needed but the goal of this treartment is to wean you off your pain medication.  You may have weakness after the procedure due to the numbing injection.  You may feel a sunburn effect and some patients may need a burn cream for the skin after 2 days.    Use ice pack for discomfort :apply for 20 minutes, remove for 20 minutes for up to 2 days. Do not sleep with ice pack.  Do not use a heating pad or take tub baths or swim for 2 days.  You may shower today  Gradually increase your activities.  Dont lift anything over 10 lbs for the first 24 hours  Dont drive the day of the procedure Wait 24 hrs to drive.  Wait until tomorrow to resume any  blood thinners (aspirin, Plavix, Coumadin) but you may resume all your other medications today.  If Diabetic, monitor you glucose carefully due to steroids  used for this procedure    Seek Medical Attention if you have:  Severe pain or headache  Fever or chills  Redness or swelling around the injection site   Difficulty breathing  Vomiting or Increasing numbness or weakness in arms or legs    After Surgery:  Always be aware that any surgery can cause these symptoms:    Pain- Medication can be prescribed for pain to decrease your pain but may not completely take your pain away.  Over the Counter pain medicine my be enough and you can always use Ice and rest to help ease pain.    Bleeding- a little bleeding after a surgery is usually within normal.  If there is a lot of blood you need to notify your MD.  Emergency treatments of bleeding are cold application, elevation of the bleeding site and compression.    Infection- Infection after surgery is NOT a normal occurrence.  Signs of infection are fever, swelling, hot to touch the incision.  If this occurs notify your MD immediately.    Nausea- this can be common after a surgery especially if you have had anesthesia medicine or are taking pain medicine. The steroid the Dr injected can have a side effect of Nausea.  Staying on clear liquids, bland foods, gingerale, or over the counter anti nausea medicines can help.  If you vomit more than once, notify your MD.  Anti Nausea medicines can be prescribed.

## 2019-10-23 NOTE — PLAN OF CARE
Dc criteria met. Denies pain. Leg raises performed in bed without difficulty and instructed on fall risk prior to ambulation. Home with granddaughter driving.

## 2019-10-24 VITALS
TEMPERATURE: 98 F | HEART RATE: 76 BPM | RESPIRATION RATE: 18 BRPM | HEIGHT: 59 IN | OXYGEN SATURATION: 95 % | DIASTOLIC BLOOD PRESSURE: 62 MMHG | SYSTOLIC BLOOD PRESSURE: 135 MMHG | BODY MASS INDEX: 26.61 KG/M2 | WEIGHT: 132 LBS

## 2019-11-12 ENCOUNTER — TELEPHONE (OUTPATIENT)
Dept: ORTHOPEDICS | Facility: CLINIC | Age: 74
End: 2019-11-12

## 2019-11-12 NOTE — TELEPHONE ENCOUNTER
----- Message from Bobbi Anna sent at 11/11/2019  1:02 PM CST -----  Contact: pt called   Patient called stating she is having severe neck pain that she believes to be spasms. Wants an appointment for Wednesday but some sort of medication to be called in to help her until then. I informed her that I would send a message to the nurse and have the nurse call to help her out because I didn't know if that could be done. She voiced understanding.   Pt #: [phone]

## 2019-11-13 ENCOUNTER — OFFICE VISIT (OUTPATIENT)
Dept: ORTHOPEDICS | Facility: CLINIC | Age: 74
End: 2019-11-13
Payer: MEDICARE

## 2019-11-13 VITALS — BODY MASS INDEX: 25.65 KG/M2 | SYSTOLIC BLOOD PRESSURE: 138 MMHG | DIASTOLIC BLOOD PRESSURE: 80 MMHG | WEIGHT: 127 LBS

## 2019-11-13 DIAGNOSIS — M54.2 CERVICAL PAIN (NECK): ICD-10-CM

## 2019-11-13 DIAGNOSIS — M47.812 ARTHROPATHY OF CERVICAL FACET JOINT: ICD-10-CM

## 2019-11-13 DIAGNOSIS — M46.02 SPINAL ENTHESOPATHY OF CERVICAL REGION: Primary | ICD-10-CM

## 2019-11-13 DIAGNOSIS — M50.31 OTHER CERVICAL DISC DEGENERATION, HIGH CERVICAL REGION: ICD-10-CM

## 2019-11-13 PROCEDURE — 20552 NJX 1/MLT TRIGGER POINT 1/2: CPT | Mod: S$GLB,,, | Performed by: ORTHOPAEDIC SURGERY

## 2019-11-13 PROCEDURE — 99213 OFFICE O/P EST LOW 20 MIN: CPT | Mod: 25,S$GLB,, | Performed by: ORTHOPAEDIC SURGERY

## 2019-11-13 PROCEDURE — 99213 PR OFFICE/OUTPT VISIT, EST, LEVL III, 20-29 MIN: ICD-10-PCS | Mod: 25,S$GLB,, | Performed by: ORTHOPAEDIC SURGERY

## 2019-11-13 PROCEDURE — 20552: ICD-10-PCS | Mod: S$GLB,,, | Performed by: ORTHOPAEDIC SURGERY

## 2019-11-13 RX ORDER — METHYLPREDNISOLONE ACETATE 40 MG/ML
40 INJECTION, SUSPENSION INTRA-ARTICULAR; INTRALESIONAL; INTRAMUSCULAR; SOFT TISSUE
Status: DISCONTINUED | OUTPATIENT
Start: 2019-11-13 | End: 2019-11-13 | Stop reason: HOSPADM

## 2019-11-13 RX ORDER — CYCLOBENZAPRINE HCL 5 MG
TABLET ORAL
Refills: 0 | COMMUNITY
Start: 2019-11-07 | End: 2020-01-15

## 2019-11-13 RX ORDER — MULTIVITAMIN
TABLET ORAL
COMMUNITY
Start: 2019-10-08

## 2019-11-13 RX ADMIN — METHYLPREDNISOLONE ACETATE 40 MG: 40 INJECTION, SUSPENSION INTRA-ARTICULAR; INTRALESIONAL; INTRAMUSCULAR; SOFT TISSUE at 09:11

## 2019-11-13 NOTE — PROGRESS NOTES
Subjective:       Patient ID: Madhuri Manuel is a 74 y.o. female.    Chief Complaint: Pain of the Neck (Neck pain radiates to right shoulder x 4 weeks. No other treatment. )      History of Present Illness  Patient is here follow-up for her neck and shoulder.  She has a recurrence of some of her previous symptomatology.  She has known degenerative disc disease adjacent to her previous fusion at C3-4 with a right side predominant disc herniation.  She has cervical facet arthropathy and has undergone the rhizotomy a few months ago.  Her current symptoms are bothersome but not enough to consider further surgery at this time.  She also has some significant muscle spasm of the right upper trapezius and sternocleidomastoid.    Current Medications  Current Outpatient Medications   Medication Sig Dispense Refill    aspirin 81 MG Chew Take 1 tablet by mouth.      atorvastatin (LIPITOR) 80 MG tablet       CALCITRATE-VITAMIN D 315-250 mg-unit Tab       calcium-vitamin D 600 mg(1,500mg) -400 unit Tab       carboxymethylcell-glycerin,PF, (REFRESH OPTIVE SENSITIVE, PF,) 0.5-0.9 % Dpet       cholecalciferol, vitamin D3, (VITAMIN D3) 2,000 unit Tab       cyclobenzaprine (FLEXERIL) 5 MG tablet Take 1-2 tablets (5-10 mg total) by mouth 2 (two) times daily as needed for Muscle spasms.  0    FREESTYLE LITE STRIPS Strp       glipiZIDE (GLUCOTROL) 2.5 MG TR24       JANUMET XR 50-1,000 mg TM24       losartan (COZAAR) 25 MG tablet       magnesium oxide (MAG-OX) 400 mg (241.3 mg magnesium) tablet       omeprazole (PRILOSEC) 20 MG capsule       sertraline (ZOLOFT) 100 MG tablet       traZODone (DESYREL) 50 MG tablet       VITAMIN B-6 50 MG tablet       zoledronic acid-mannitol & water (RECLAST) 5 mg/100 mL PgBk Inject 5 mg into the vein.       No current facility-administered medications for this visit.        Allergies  Review of patient's allergies indicates:   Allergen Reactions    Iodinated contrast media Hives     Penicillins Rash     started after taking 8-10 days of augmentin. She did have a reclast injection in the last 24 hours also but has never had problems with this.      Latex Rash       Past Medical History  Past Medical History:   Diagnosis Date    Anemia     Diabetes mellitus, type 2     Fracture of left foot     Hypertension     Neck pain     Osteoporosis        Surgical History  Past Surgical History:   Procedure Laterality Date    CERVICAL FUSION      gastric by pass      INJECTION OF ANESTHETIC AGENT AROUND MEDIAL BRANCH NERVES INNERVATING CERVICAL FACET JOINT Right 8/30/2019    Procedure: Block-nerve-medial branch-cervical;  Surgeon: Vasquez Polk MD;  Location: [facility] OR;  Service: Pain Management;  Laterality: Right;  C3,4,5,6    INJECTION OF ANESTHETIC AGENT AROUND MEDIAL BRANCH NERVES INNERVATING LUMBAR FACET JOINT Right 10/3/2019    Procedure: Block-nerve-medial branch-lumbar;  Surgeon: Vasquez Polk MD;  Location: formerly [facility] OR;  Service: Pain Management;  Laterality: Right;  L4, L5    kidney stones      RADIOFREQUENCY ABLATION OF LUMBAR MEDIAL BRANCH NERVE AT SINGLE LEVEL Right 10/23/2019    Procedure: Radiofrequency Ablation, Nerve, Spinal, Lumbar, Medial Branch, L4,5;  Surgeon: Vasquez Polk MD;  Location: [facility] OR;  Service: Pain Management;  Laterality: Right;  Burned at 80 degrees C X 60 seconds X 2 each site    RADIOFREQUENCY THERMOCOAGULATION Right 9/20/2019    Procedure: RADIOFREQUENCY THERMAL COAGULATION-CERVICAL;  Surgeon: Vasquez Polk MD;  Location: [facility] OR;  Service: Pain Management;  Laterality: Right;  CERVIAL C 3,4,5,6 -- Burned at 80 degrees C. for 60 seconds each site    TRANSFORAMINAL EPIDURAL INJECTION OF STEROID Right 7/3/2019    Procedure: Injection,steroid,epidural,transforaminal approach C3-4;  Surgeon: Vasquez Polk MD;  Location: formerly [facility] OR;  Service: Pain Management;  Laterality: Right;       Family History:   History reviewed. No pertinent family history.    Social History:   Social  History     Socioeconomic History    Marital status:      Spouse name: Not on file    Number of children: Not on file    Years of education: Not on file    Highest education level: Not on file   Occupational History    Not on file   Social Needs    Financial resource strain: Not on file    Food insecurity:     Worry: Not on file     Inability: Not on file    Transportation needs:     Medical: Not on file     Non-medical: Not on file   Tobacco Use    Smoking status: Never Smoker    Smokeless tobacco: Never Used   Substance and Sexual Activity    Alcohol use: Never     Frequency: Never    Drug use: Not on file    Sexual activity: Not on file   Lifestyle    Physical activity:     Days per week: Not on file     Minutes per session: Not on file    Stress: Not on file   Relationships    Social connections:     Talks on phone: Not on file     Gets together: Not on file     Attends Rastafari service: Not on file     Active member of club or organization: Not on file     Attends meetings of clubs or organizations: Not on file     Relationship status: Not on file   Other Topics Concern    Not on file   Social History Narrative    Not on file       Hospitalization/Major Diagnostic Procedure:     Review of Systems     General/Constitutional:  Chills denies. Fatigue denies. Fever denies. Weight gain denies. Weight loss denies.    Respiratory:  Shortness of breath denies.    Cardiovascular:  Chest pain denies.    Gastrointestinal:  Constipation denies. Diarrhea denies. Nausea denies. Vomiting denies.     Hematology:  Easy bruising denies. Prolonged bleeding denies.     Genitourinary:  Frequent urination denies. Pain in lower back denies. Painful urination denies.     Musculoskeletal:  See HPI for details    Skin:  Rash denies.    Neurologic:  Dizziness denies. Gait abnormalities denies. Seizures denies. Tingling/Numbess denies.    Psychiatric:  Anxiety denies. Depressed mood denies.     Objective:   Vital  "Signs:   Vitals:    11/13/19 0954   BP: 138/80        Physical Exam      General Examination:     Constitutional: The patient is alert and oriented to lace person and time. Mood is pleasant.     Head/Face: Normal facial features normal eyebrows    Eyes: Normal extraocular motion bilaterally    Lungs: Respirations are equal and unlabored    Gait is coordinated.    Cardiovascular: There are no swelling or varicosities present.    Lymphatic: Negative for adenopathy    Skin: Normal    Neurological: Level of consciousness normal. Oriented to place person and time and situation    Psychiatric: Oriented to time place person and situation    Cervical exam:  Limited range of motion in all planes.  Anterior cervical incision remains well healed.  Tenderness to palpation with palpable trigger point to the right sternocleidomastoid, right upper trapezius, and right rhomboids.  Right upper extremity is distally neurovascular intact.  XRAY Report/ Interpretation:  No new studies today      Assessment:       1. Spinal enthesopathy of cervical region    2. Other cervical disc degeneration, high cervical region    3. Cervical pain (neck)    4. Arthropathy of cervical facet joint        Plan:       Madhuri was seen today for pain.    Diagnoses and all orders for this visit:    Spinal enthesopathy of cervical region    Other cervical disc degeneration, high cervical region    Cervical pain (neck)    Arthropathy of cervical facet joint         No follow-ups on file.  Sukh Moscoso, physician's assistant served in the capacity as a "scribe" for this patient encounter  A "face to face" encounter occurred with Dr. Diaz on this date  The treatment plan and medical decision making is outlined below:  Today she was given 2 trigger point injections each with 40 mg Depo-Medrol.  One was given in the right upper trapezius muscle and the other was given in the right sternocleidomastoid muscle.  See procedure ports were detailed.  She " tolerated the procedure well.  Consider the option of C3-4 ACDF in the future.    This note was created using Dragon voice recognition software that occasionally misinterpreted phrases or words.

## 2020-01-15 ENCOUNTER — OFFICE VISIT (OUTPATIENT)
Dept: ORTHOPEDICS | Facility: CLINIC | Age: 75
End: 2020-01-15
Payer: MEDICARE

## 2020-01-15 ENCOUNTER — TELEPHONE (OUTPATIENT)
Dept: PAIN MEDICINE | Facility: CLINIC | Age: 75
End: 2020-01-15

## 2020-01-15 VITALS — WEIGHT: 128 LBS | BODY MASS INDEX: 25.85 KG/M2 | DIASTOLIC BLOOD PRESSURE: 74 MMHG | SYSTOLIC BLOOD PRESSURE: 132 MMHG

## 2020-01-15 DIAGNOSIS — M47.812 ARTHROPATHY OF CERVICAL FACET JOINT: ICD-10-CM

## 2020-01-15 DIAGNOSIS — M46.02 SPINAL ENTHESOPATHY OF CERVICAL REGION: Primary | ICD-10-CM

## 2020-01-15 DIAGNOSIS — Z98.1 HISTORY OF FUSION OF CERVICAL SPINE: ICD-10-CM

## 2020-01-15 PROCEDURE — 1159F PR MEDICATION LIST DOCUMENTED IN MEDICAL RECORD: ICD-10-PCS | Mod: S$GLB,,, | Performed by: ORTHOPAEDIC SURGERY

## 2020-01-15 PROCEDURE — 1159F MED LIST DOCD IN RCRD: CPT | Mod: S$GLB,,, | Performed by: ORTHOPAEDIC SURGERY

## 2020-01-15 PROCEDURE — 1125F AMNT PAIN NOTED PAIN PRSNT: CPT | Mod: S$GLB,,, | Performed by: ORTHOPAEDIC SURGERY

## 2020-01-15 PROCEDURE — 1125F PR PAIN SEVERITY QUANTIFIED, PAIN PRESENT: ICD-10-PCS | Mod: S$GLB,,, | Performed by: ORTHOPAEDIC SURGERY

## 2020-01-15 PROCEDURE — 99213 OFFICE O/P EST LOW 20 MIN: CPT | Mod: S$GLB,,, | Performed by: ORTHOPAEDIC SURGERY

## 2020-01-15 PROCEDURE — 99213 PR OFFICE/OUTPT VISIT, EST, LEVL III, 20-29 MIN: ICD-10-PCS | Mod: S$GLB,,, | Performed by: ORTHOPAEDIC SURGERY

## 2020-01-15 RX ORDER — CARBOXYMETHYLCELLULOSE SODIUM 5 MG/ML
SOLUTION/ DROPS OPHTHALMIC
COMMUNITY
Start: 2020-01-03

## 2020-01-15 NOTE — PROGRESS NOTES
Subjective:       Patient ID: Madhuri Manuel is a 74 y.o. female.    Chief Complaint: Pain of the Neck (Neck pain is worse and radiates down to shoulders and right elbow. It feels like something is crawling on her. NO other treatment)      History of Present Illness     Patient is here follow-up for her neck and shoulder.  She has a recurrence of some of her previous symptomatology.  She has known degenerative disc disease adjacent to her previous fusion at C3-4 with a right side predominant disc herniation.  She has cervical facet arthropathy and has undergone the rhizotomy a few months ago.  Her current symptoms are bothersome but not enough to consider further surgery. She did undergo a trigger point injection last visit.  Symptoms are mainly right-sided neck pain  Current Medications  Current Outpatient Medications   Medication Sig Dispense Refill    aspirin 81 MG Chew Take 1 tablet by mouth.      atorvastatin (LIPITOR) 80 MG tablet       CALCITRATE-VITAMIN D 315-250 mg-unit Tab       calcium-vitamin D 600 mg(1,500mg) -400 unit Tab       carboxymethylcell-glycerin,PF, (REFRESH OPTIVE SENSITIVE, PF,) 0.5-0.9 % Dpet       cholecalciferol, vitamin D3, (VITAMIN D3) 2,000 unit Tab       FREESTYLE LITE STRIPS Strp       glipiZIDE (GLUCOTROL) 2.5 MG TR24       JANUMET XR 50-1,000 mg TM24       losartan (COZAAR) 25 MG tablet       magnesium oxide (MAG-OX) 400 mg (241.3 mg magnesium) tablet       omeprazole (PRILOSEC) 20 MG capsule       PAZEO 0.7 % Drop       REFRESH TEARS 0.5 % Drop       sertraline (ZOLOFT) 100 MG tablet       traZODone (DESYREL) 50 MG tablet       VITAMIN B-6 50 MG tablet       zoledronic acid-mannitol & water (RECLAST) 5 mg/100 mL PgBk Inject 5 mg into the vein.       No current facility-administered medications for this visit.        Allergies  Review of patient's allergies indicates:   Allergen Reactions    Iodinated contrast media Hives    Penicillins Rash     started after  taking 8-10 days of augmentin. She did have a reclast injection in the last 24 hours also but has never had problems with this.      Latex Rash       Past Medical History  Past Medical History:   Diagnosis Date    Anemia     Diabetes mellitus, type 2     Fracture of left foot     Hypertension     Neck pain     Osteoporosis        Surgical History  Past Surgical History:   Procedure Laterality Date    CERVICAL FUSION      gastric by pass      INJECTION OF ANESTHETIC AGENT AROUND MEDIAL BRANCH NERVES INNERVATING CERVICAL FACET JOINT Right 8/30/2019    Procedure: Block-nerve-medial branch-cervical;  Surgeon: Vasquez Polk MD;  Location: [facility] OR;  Service: Pain Management;  Laterality: Right;  C3,4,5,6    INJECTION OF ANESTHETIC AGENT AROUND MEDIAL BRANCH NERVES INNERVATING LUMBAR FACET JOINT Right 10/3/2019    Procedure: Block-nerve-medial branch-lumbar;  Surgeon: Vasquez Polk MD;  Location: [facility] OR;  Service: Pain Management;  Laterality: Right;  L4, L5    kidney stones      RADIOFREQUENCY ABLATION OF LUMBAR MEDIAL BRANCH NERVE AT SINGLE LEVEL Right 10/23/2019    Procedure: Radiofrequency Ablation, Nerve, Spinal, Lumbar, Medial Branch, L4,5;  Surgeon: Vasquez Polk MD;  Location: [facility] OR;  Service: Pain Management;  Laterality: Right;  Burned at 80 degrees C X 60 seconds X 2 each site    RADIOFREQUENCY THERMOCOAGULATION Right 9/20/2019    Procedure: RADIOFREQUENCY THERMAL COAGULATION-CERVICAL;  Surgeon: Vasquez Polk MD;  Location: [facility] OR;  Service: Pain Management;  Laterality: Right;  CERVIAL C 3,4,5,6 -- Burned at 80 degrees C. for 60 seconds each site    TRANSFORAMINAL EPIDURAL INJECTION OF STEROID Right 7/3/2019    Procedure: Injection,steroid,epidural,transforaminal approach C3-4;  Surgeon: Vasquez Polk MD;  Location: [facility] OR;  Service: Pain Management;  Laterality: Right;       Family History:   History reviewed. No pertinent family history.    Social History:   Social History     Socioeconomic History     Marital status:      Spouse name: Not on file    Number of children: Not on file    Years of education: Not on file    Highest education level: Not on file   Occupational History    Not on file   Social Needs    Financial resource strain: Not on file    Food insecurity:     Worry: Not on file     Inability: Not on file    Transportation needs:     Medical: Not on file     Non-medical: Not on file   Tobacco Use    Smoking status: Never Smoker    Smokeless tobacco: Never Used   Substance and Sexual Activity    Alcohol use: Never     Frequency: Never    Drug use: Not on file    Sexual activity: Not on file   Lifestyle    Physical activity:     Days per week: Not on file     Minutes per session: Not on file    Stress: Not on file   Relationships    Social connections:     Talks on phone: Not on file     Gets together: Not on file     Attends Taoist service: Not on file     Active member of club or organization: Not on file     Attends meetings of clubs or organizations: Not on file     Relationship status: Not on file   Other Topics Concern    Not on file   Social History Narrative    Not on file       Hospitalization/Major Diagnostic Procedure:     Review of Systems     General/Constitutional:  Chills denies. Fatigue denies. Fever denies. Weight gain denies. Weight loss denies.    Respiratory:  Shortness of breath denies.    Cardiovascular:  Chest pain denies.    Gastrointestinal:  Constipation denies. Diarrhea denies. Nausea denies. Vomiting denies.     Hematology:  Easy bruising denies. Prolonged bleeding denies.     Genitourinary:  Frequent urination denies. Pain in lower back denies. Painful urination denies.     Musculoskeletal:  See HPI for details    Skin:  Rash denies.    Neurologic:  Dizziness denies. Gait abnormalities denies. Seizures denies. Tingling/Numbess denies.    Psychiatric:  Anxiety denies. Depressed mood denies.     Objective:   Vital Signs:   Vitals:    01/15/20 1001    BP: 132/74        Physical Exam      General Examination:     Constitutional: The patient is alert and oriented to lace person and time. Mood is pleasant.     Head/Face: Normal facial features normal eyebrows    Eyes: Normal extraocular motion bilaterally    Lungs: Respirations are equal and unlabored    Gait is coordinated.    Cardiovascular: There are no swelling or varicosities present.    Lymphatic: Negative for adenopathy    Skin: Normal    Neurological: Level of consciousness normal. Oriented to place person and time and situation    Psychiatric: Oriented to time place person and situation    Well-healed anterior cervical incision nontender moderate tenderness posterior paraspinous muscles high right side. No spasm range of motion slightly limited Spurling's maneuver negative  XRAY Report/ Interpretation:  Previous x-ray shows solid cervical fusion and some minimal degenerative changes above the previous fusion      Assessment:       1. Spinal enthesopathy of cervical region    2. Arthropathy of cervical facet joint    3. History of fusion of cervical spine        Plan:       Madhuri was seen today for pain.    Diagnoses and all orders for this visit:    Spinal enthesopathy of cervical region    Arthropathy of cervical facet joint    History of fusion of cervical spine         No follow-ups on file.    Treatment options were discussed patient rib for assessment doctor to move the evaluate for right-sided cervical medial branch blocks C2-3 C3-4 levels above her previous fusion    This note was created using Dragon voice recognition software that occasionally misinterpreted phrases or words.

## 2020-01-17 ENCOUNTER — OFFICE VISIT (OUTPATIENT)
Dept: PAIN MEDICINE | Facility: CLINIC | Age: 75
End: 2020-01-17
Payer: MEDICARE

## 2020-01-17 VITALS
HEIGHT: 59 IN | WEIGHT: 123 LBS | SYSTOLIC BLOOD PRESSURE: 121 MMHG | DIASTOLIC BLOOD PRESSURE: 66 MMHG | BODY MASS INDEX: 24.8 KG/M2 | HEART RATE: 60 BPM

## 2020-01-17 DIAGNOSIS — M51.36 DDD (DEGENERATIVE DISC DISEASE), LUMBAR: ICD-10-CM

## 2020-01-17 DIAGNOSIS — M50.30 DDD (DEGENERATIVE DISC DISEASE), CERVICAL: ICD-10-CM

## 2020-01-17 DIAGNOSIS — M47.892 OTHER SPONDYLOSIS, CERVICAL REGION: Primary | ICD-10-CM

## 2020-01-17 DIAGNOSIS — M47.896 OTHER SPONDYLOSIS, LUMBAR REGION: ICD-10-CM

## 2020-01-17 PROCEDURE — 1125F AMNT PAIN NOTED PAIN PRSNT: CPT | Mod: ,,, | Performed by: ANESTHESIOLOGY

## 2020-01-17 PROCEDURE — 99214 PR OFFICE/OUTPT VISIT, EST, LEVL IV, 30-39 MIN: ICD-10-PCS | Mod: S$PBB,,, | Performed by: ANESTHESIOLOGY

## 2020-01-17 PROCEDURE — 1159F MED LIST DOCD IN RCRD: CPT | Mod: ,,, | Performed by: ANESTHESIOLOGY

## 2020-01-17 PROCEDURE — 1159F PR MEDICATION LIST DOCUMENTED IN MEDICAL RECORD: ICD-10-PCS | Mod: ,,, | Performed by: ANESTHESIOLOGY

## 2020-01-17 PROCEDURE — 99214 OFFICE O/P EST MOD 30 MIN: CPT | Mod: S$PBB,,, | Performed by: ANESTHESIOLOGY

## 2020-01-17 PROCEDURE — 99999 PR PBB SHADOW E&M-EST. PATIENT-LVL IV: CPT | Mod: PBBFAC,,, | Performed by: ANESTHESIOLOGY

## 2020-01-17 PROCEDURE — 1125F PR PAIN SEVERITY QUANTIFIED, PAIN PRESENT: ICD-10-PCS | Mod: ,,, | Performed by: ANESTHESIOLOGY

## 2020-01-17 PROCEDURE — 99214 OFFICE O/P EST MOD 30 MIN: CPT | Mod: PBBFAC,PN | Performed by: ANESTHESIOLOGY

## 2020-01-17 PROCEDURE — 99999 PR PBB SHADOW E&M-EST. PATIENT-LVL IV: ICD-10-PCS | Mod: PBBFAC,,, | Performed by: ANESTHESIOLOGY

## 2020-01-17 NOTE — PROGRESS NOTES
This note was completed with dictation software and grammatical errors may exist.    Referring Physician: Wilver Diaz MD    PCP: [id number]      CC:  Right-sided neck pain and lower back pain    Interval history:  Patient up patient clinic.  She is referred to us for interventions by Dr. Diaz.  She underwent cervical and lumbar MB RFA procedures.  Neck and lower back pain is currently tolerable.  She does have exacerbation of pain at times.  She states pain is improved since the procedure. She is consider repeat procedure in the future if pain recurs.  She denies any worsening weakness.  No bowel bladder  Prior HPI:   Madhuri Manuel is a 74 y.o. female referred to us for right-sided neck pain. Pain has been present for 5 years.  She has history of cervical fusion in 2017.  She presents to us with constant burning, throbbing pain in her neck.  Pain radiates to her right shoulder.  Pain did radiate down her right arm.  She underwent a right-sided changes.  cervical transforaminal epidural steroid injection on July 3, 2019 which help with her radiating right arm pain.  Neck and right shoulder pain still remains in his bothersome.  Pain worsens with lateral rotation, extension lifting.  Pain improves with rest.  She is referred back for spine surgery for consideration of cervical medial branch blocks.  She denies any worsening weakness.  No bowel bladder changes.    Interventional history:  Cervical PATRICK 07/2019   Right C3-C6 MB RFA 09/2019 with 50% relief.  Right L3-L5 MB RFA procedure with 50% relief    ROS:  CONSTITUTIONAL: No fevers, chills, night sweats, wt. loss, appetite changes  SKIN: no rashes or itching  ENT: No headaches, head trauma, vision changes, or eye pain  LYMPH NODES: None noticed   CV: No chest pain, palpitations.   RESP: No shortness of breath, dyspnea on exertion, cough, wheezing, or hemoptysis  GI: No nausea, emesis, diarrhea, constipation, melena, hematochezia, pain.     : No dysuria, hematuria, urgency, or frequency   HEME: No easy bruising, bleeding problems  PSYCHIATRIC: No depression, anxiety, psychosis, hallucinations.  NEURO: No seizures, memory loss, dizziness or difficulty sleeping  MSK:  Positive HPI      Past Medical History:   Diagnosis Date    Anemia     Diabetes mellitus, type 2     Fracture of left foot     Hypertension     Neck pain     Osteoporosis      Past Surgical History:   Procedure Laterality Date    CERVICAL FUSION      gastric by pass      INJECTION OF ANESTHETIC AGENT AROUND MEDIAL BRANCH NERVES INNERVATING CERVICAL FACET JOINT Right 8/30/2019    Procedure: Block-nerve-medial branch-cervical;  Surgeon: Vasquez Polk MD;  Location: [facility];  Service: Pain Management;  Laterality: Right;  C3,4,5,6    INJECTION OF ANESTHETIC AGENT AROUND MEDIAL BRANCH NERVES INNERVATING LUMBAR FACET JOINT Right 10/3/2019    Procedure: Block-nerve-medial branch-lumbar;  Surgeon: Vasquez Polk MD;  Location: [facility];  Service: Pain Management;  Laterality: Right;  L4, L5    kidney stones      RADIOFREQUENCY ABLATION OF LUMBAR MEDIAL BRANCH NERVE AT SINGLE LEVEL Right 10/23/2019    Procedure: Radiofrequency Ablation, Nerve, Spinal, Lumbar, Medial Branch, L4,5;  Surgeon: Vasquez Polk MD;  Location: [facility];  Service: Pain Management;  Laterality: Right;  Burned at 80 degrees C X 60 seconds X 2 each site    RADIOFREQUENCY THERMOCOAGULATION Right 9/20/2019    Procedure: RADIOFREQUENCY THERMAL COAGULATION-CERVICAL;  Surgeon: Vasquez Polk MD;  Location: [facility];  Service: Pain Management;  Laterality: Right;  CERVIAL C 3,4,5,6 -- Burned at 80 degrees C. for 60 seconds each site    TRANSFORAMINAL EPIDURAL INJECTION OF STEROID Right 7/3/2019    Procedure: Injection,steroid,epidural,transforaminal approach C3-4;  Surgeon: Vasquez Plok MD;  Location: [facility];  Service: Pain Management;  Laterality: Right;     History reviewed. No pertinent family history.  Social History  "    Socioeconomic History    Marital status:      Spouse name: Not on file    Number of children: Not on file    Years of education: Not on file    Highest education level: Not on file   Occupational History    Not on file   Social Needs    Financial resource strain: Not on file    Food insecurity:     Worry: Not on file     Inability: Not on file    Transportation needs:     Medical: Not on file     Non-medical: Not on file   Tobacco Use    Smoking status: Never Smoker    Smokeless tobacco: Never Used   Substance and Sexual Activity    Alcohol use: Never     Frequency: Never    Drug use: Not on file    Sexual activity: Not on file   Lifestyle    Physical activity:     Days per week: Not on file     Minutes per session: Not on file    Stress: Not on file   Relationships    Social connections:     Talks on phone: Not on file     Gets together: Not on file     Attends Rastafarian service: Not on file     Active member of club or organization: Not on file     Attends meetings of clubs or organizations: Not on file     Relationship status: Not on file   Other Topics Concern    Not on file   Social History Narrative    Not on file         Medications/Allergies: See med card    Vitals:    01/17/20 1049   BP: 121/66   Pulse: 60   Weight: 55.8 kg (123 lb)   Height: 4' 11" (1.499 m)   PainSc:   6   PainLoc: Neck         Physical exam:    GENERAL: A and O x3, the patient appears well groomed and is in no acute distress.  Skin: No rashes or obvious lesions  HEENT: normocephalic, atraumatic  CARDIOVASCULAR:  Palpable peripheral pulses  LUNGS: easy work of breathing  ABDOMEN: soft, nontender   UPPER EXTREMITIES: Normal alignment, normal range of motion, no atrophy, no skin changes,  hair growth and nail growth normal and equal bilaterally. No swelling, no tenderness.    LOWER EXTREMITIES:  Normal alignment, normal range of motion, no atrophy, no skin changes,  hair growth and nail growth normal and equal " bilaterally. No swelling, no tenderness.  CERVICAL SPINE:  Cervical spine: ROM is limited in flexion, extension and lateral rotation with moderate increased pain.  Spurling's maneuver causes neck pain to right side.  Myofascial exam: No Tenderness to palpation across cervical paraspinous region bilaterally.    MENTAL STATUS: normal orientation, speech, language, and fund of knowledge for social situation.  Emotional state appropriate.    CRANIAL NERVES:  II:  PERRL bilaterally,   III,IV,VI: EOMI.    V:  Facial sensation equal bilaterally  VII:  Facial motor function normal.  VIII:  Hearing equal to finger rub bilaterally  IX/X: Gag normal, palate symmetric  XI:  Shoulder shrug equal, head turn equal  XII:  Tongue midline without fasciculations      MOTOR: Tone and bulk: normal bilateral upper and lower Strength: normal   Delt Bi Tri WE WF     R 5 5 5 5 5 5   L 5 5 5 5 5 5     IP ADD ABD Quad TA Gas HAM  R 5 5 5 5 5 5 5  L 5 5 5 5 5 5 5    SENSATION: Light touch and pinprick intact bilaterally  REFLEXES: normal, symmetric, nonbrisk.  Toes down, no clonus. No hoffmans.  GAIT: normal rise, base, steps, and arm swing.        Imaging:  MRI C-spine 5/2019  There are surgical changes related to previous instrumented multilevel anterior  fusion with plate and screw fixation extending from C4 to C7. The vertebral  bodies are appropriately maintained in height. There is mild retrolisthesis at  C3-C4 and minimal anterolisthesis at C7-T1. There is mild wedge-shaped  configuration of the T3 vertebral body with Schmorl's node defects observed  within both the superior and inferior endplate. There is no associated marrow  edema.    At C2-C3, shallow disc bulging and osteophytic ridging results in a mild degree  of mass effect upon the thecal sac towards the right of midline without  impingement of the cervical cord. Facet degenerative changes contribute to  minor right foraminal narrowing.    At C3-C4, there is a large  extrusion of the disc margin extending from the far  right lateral canal into the proximal right neural foramen. This is  superimposed on broad-based disc bulging and posterior osteophytic ridging.  Focal mass effect upon the right side of the thecal sac contacts and slightly  deforms the cervical cord towards the right of midline. Facet degenerative  changes contribute to severe right foraminal stenosis. There is associated mild  signal alteration within the substance of the cord at this level, likely  reflection of myelomalacia. There is a mild-moderate degree of left foraminal  narrowing.    At C4-C5, shallow disc bulging and osteophytic ridging results in mild mass  effect upon the ventral margin of the thecal sac with asymmetry towards the  right of midline. Facet degenerative changes contribute to moderately severe  degrees of foraminal encroachment.    At C5-C6, posterior marginal osteophyte formation extends from the far right  lateral canal into the right neural foramen contributing to asymmetric mass  effect upon the thecal sac without direct cord impingement. There is severe  right foraminal stenosis. Facet degenerative changes contribute to moderately  severe left foraminal narrowing.    At C6-C7, shallow disc bulging results in mild asymmetric mass effect upon the  thecal sac towards the right of midline. Facet degenerative changes contribute  to moderately severe right foraminal and moderate left foraminal narrowing.    At C7-T1, there is no significant disc bulging or focal soft disc herniation.  Facet degenerative changes which contributes to mild degrees of foraminal  narrowing.    Assessment:  Patient referred for neck and right shoulder pain  1. Other spondylosis, cervical region    2. Other spondylosis, lumbar region    3. DDD (degenerative disc disease), cervical    4. DDD (degenerative disc disease), lumbar          Plan:  1. I have stressed the importance of physical activity and exercise to  improve overall health  2. Reviewed pertinent imaging and records with patient.   3.  Patient has had benefit with recent cervical and lumbar MB RFA procedures.  She will contact us when pain recurs and we will proceed with repeat procedures in the future.  4.  She will follow-up as needed.

## 2020-06-15 ENCOUNTER — OFFICE VISIT (OUTPATIENT)
Dept: ORTHOPEDICS | Facility: CLINIC | Age: 75
End: 2020-06-15
Payer: MEDICARE

## 2020-06-15 VITALS — DIASTOLIC BLOOD PRESSURE: 80 MMHG | BODY MASS INDEX: 26.05 KG/M2 | WEIGHT: 129 LBS | SYSTOLIC BLOOD PRESSURE: 144 MMHG

## 2020-06-15 DIAGNOSIS — Z98.1 HISTORY OF FUSION OF CERVICAL SPINE: ICD-10-CM

## 2020-06-15 DIAGNOSIS — M75.41 IMPINGEMENT SYNDROME OF RIGHT SHOULDER: ICD-10-CM

## 2020-06-15 DIAGNOSIS — M47.812 ARTHROPATHY OF CERVICAL FACET JOINT: ICD-10-CM

## 2020-06-15 DIAGNOSIS — M46.02 SPINAL ENTHESOPATHY OF CERVICAL REGION: Primary | ICD-10-CM

## 2020-06-15 PROCEDURE — 99213 OFFICE O/P EST LOW 20 MIN: CPT | Mod: S$GLB,,, | Performed by: ORTHOPAEDIC SURGERY

## 2020-06-15 PROCEDURE — 99213 PR OFFICE/OUTPT VISIT, EST, LEVL III, 20-29 MIN: ICD-10-PCS | Mod: S$GLB,,, | Performed by: ORTHOPAEDIC SURGERY

## 2020-06-15 RX ORDER — DICLOFENAC SODIUM 10 MG/G
GEL TOPICAL
COMMUNITY
Start: 2020-05-22

## 2020-06-15 NOTE — PROGRESS NOTES
Subjective:       Patient ID: Madhuri Manuel is a 74 y.o. female.    Chief Complaint: Pain of the Neck (Neck pain is the same and radiates to both shoulders and right hand with tingling in the thumb. No other treatment)      History of Present Illness  Follow-up chronic neck and low back pain patient had previous RFA of the cervical spine that did afford some relief of her symptoms though she still has some periodic neck pain without radiation    Current Medications  Current Outpatient Medications   Medication Sig Dispense Refill    aspirin 81 MG Chew Take 1 tablet by mouth.      atorvastatin (LIPITOR) 80 MG tablet       CALCITRATE-VITAMIN D 315-250 mg-unit Tab       calcium-vitamin D 600 mg(1,500mg) -400 unit Tab       carboxymethylcell-glycerin,PF, (REFRESH OPTIVE SENSITIVE, PF,) 0.5-0.9 % Dpet       cholecalciferol, vitamin D3, (VITAMIN D3) 2,000 unit Tab       FREESTYLE LITE STRIPS Strp       glipiZIDE (GLUCOTROL) 2.5 MG TR24       JANUMET XR 50-1,000 mg TM24       losartan (COZAAR) 25 MG tablet       magnesium oxide (MAG-OX) 400 mg (241.3 mg magnesium) tablet       omeprazole (PRILOSEC) 20 MG capsule       PAZEO 0.7 % Drop       REFRESH TEARS 0.5 % Drop       sertraline (ZOLOFT) 100 MG tablet       traZODone (DESYREL) 50 MG tablet       VITAMIN B-6 50 MG tablet       VOLTAREN 1 % Gel       zoledronic acid-mannitol & water (RECLAST) 5 mg/100 mL PgBk Inject 5 mg into the vein.       No current facility-administered medications for this visit.        Allergies  Review of patient's allergies indicates:   Allergen Reactions    Iodinated contrast media Hives    Penicillins Rash     started after taking 8-10 days of augmentin. She did have a reclast injection in the last 24 hours also but has never had problems with this.      Latex Rash       Past Medical History  Past Medical History:   Diagnosis Date    Anemia     Diabetes mellitus, type 2     Fracture of left foot     Hypertension      Neck pain     Osteoporosis        Surgical History  Past Surgical History:   Procedure Laterality Date    CERVICAL FUSION      gastric by pass      INJECTION OF ANESTHETIC AGENT AROUND MEDIAL BRANCH NERVES INNERVATING CERVICAL FACET JOINT Right 8/30/2019    Procedure: Block-nerve-medial branch-cervical;  Surgeon: Vasquez Polk MD;  Location: [facility];  Service: Pain Management;  Laterality: Right;  C3,4,5,6    INJECTION OF ANESTHETIC AGENT AROUND MEDIAL BRANCH NERVES INNERVATING LUMBAR FACET JOINT Right 10/3/2019    Procedure: Block-nerve-medial branch-lumbar;  Surgeon: Vasquez Polk MD;  Location: [facility];  Service: Pain Management;  Laterality: Right;  L4, L5    kidney stones      RADIOFREQUENCY ABLATION OF LUMBAR MEDIAL BRANCH NERVE AT SINGLE LEVEL Right 10/23/2019    Procedure: Radiofrequency Ablation, Nerve, Spinal, Lumbar, Medial Branch, L4,5;  Surgeon: Vasquez Polk MD;  Location: [facility];  Service: Pain Management;  Laterality: Right;  Burned at 80 degrees C X 60 seconds X 2 each site    RADIOFREQUENCY THERMOCOAGULATION Right 9/20/2019    Procedure: RADIOFREQUENCY THERMAL COAGULATION-CERVICAL;  Surgeon: Vasquez Polk MD;  Location: [facility];  Service: Pain Management;  Laterality: Right;  CERVIAL C 3,4,5,6 -- Burned at 80 degrees C. for 60 seconds each site    TRANSFORAMINAL EPIDURAL INJECTION OF STEROID Right 7/3/2019    Procedure: Injection,steroid,epidural,transforaminal approach C3-4;  Surgeon: Vasquez Polk MD;  Location: [facility];  Service: Pain Management;  Laterality: Right;       Family History:   History reviewed. No pertinent family history.    Social History:   Social History     Socioeconomic History    Marital status:      Spouse name: Not on file    Number of children: Not on file    Years of education: Not on file    Highest education level: Not on file   Occupational History    Not on file   Social Needs    Financial resource strain: Not on file    Food insecurity     Worry: Not on  file     Inability: Not on file    Transportation needs     Medical: Not on file     Non-medical: Not on file   Tobacco Use    Smoking status: Never Smoker    Smokeless tobacco: Never Used   Substance and Sexual Activity    Alcohol use: Never     Frequency: Never    Drug use: Not on file    Sexual activity: Not on file   Lifestyle    Physical activity     Days per week: Not on file     Minutes per session: Not on file    Stress: Not on file   Relationships    Social connections     Talks on phone: Not on file     Gets together: Not on file     Attends [employer] service: Not on file     Active member of club or organization: Not on file     Attends meetings of clubs or organizations: Not on file     Relationship status: Not on file   Other Topics Concern    Not on file   Social History Narrative    Not on file       Hospitalization/Major Diagnostic Procedure:     Review of Systems     General/Constitutional:  Chills denies. Fatigue denies. Fever denies. Weight gain denies. Weight loss denies.    Respiratory:  Shortness of breath denies.    Cardiovascular:  Chest pain denies.    Gastrointestinal:  Constipation denies. Diarrhea denies. Nausea denies. Vomiting denies.     Hematology:  Easy bruising denies. Prolonged bleeding denies.     Genitourinary:  Frequent urination denies. Pain in lower back denies. Painful urination denies.     Musculoskeletal:  See HPI for details    Skin:  Rash denies.    Neurologic:  Dizziness denies. Gait abnormalities denies. Seizures denies. Tingling/Numbess denies.    Psychiatric:  Anxiety denies. Depressed mood denies.     Objective:   Vital Signs:   Vitals:    06/15/20 1018   BP: (!) 144/80        Physical Exam      General Examination:     Constitutional: The patient is alert and oriented to lace person and time. Mood is pleasant.     Head/Face: Normal facial features normal eyebrows    Eyes: Normal extraocular motion bilaterally    Lungs: Respirations are equal and  unlabored    Gait is coordinated.    Cardiovascular: There are no swelling or varicosities present.    Lymphatic: Negative for adenopathy    Skin: Normal    Neurological: Level of consciousness normal. Oriented to place person and time and situation    Psychiatric: Oriented to time place person and situation    Mild restriction of motion cervical spine tenderness lower cervical paraspinous muscles Spurling maneuver causes neck pain  XRAY Report/ Interpretation:  None today      Assessment:       1. Spinal enthesopathy of cervical region    2. Arthropathy of cervical facet joint    3. History of fusion of cervical spine    4. Impingement syndrome of right shoulder        Plan:       Madhuri was seen today for pain.    Diagnoses and all orders for this visit:    Spinal enthesopathy of cervical region    Arthropathy of cervical facet joint    History of fusion of cervical spine    Impingement syndrome of right shoulder         No follow-ups on file.    For referral back to pain management at this time patient may consider repeat radiofrequency ablation procedures in the future activity as tolerated    This note was created using Dragon voice recognition software that occasionally misinterpreted phrases or words.

## 2020-09-16 ENCOUNTER — TELEPHONE (OUTPATIENT)
Dept: PAIN MEDICINE | Facility: CLINIC | Age: 75
End: 2020-09-16

## 2020-09-16 NOTE — TELEPHONE ENCOUNTER
----- Message from Nadiya Mendoza sent at 9/15/2020  8:58 AM CDT -----  Type: Needs Medical Advice  Who Called:Patient  Pharmacy name and phone #:  [facility] Call Back Number: [phone] (home)   Additional Information: The patient said she needs something for pain until her appt Friday she is in a lots of pelvic pain please advise.

## 2020-09-17 ENCOUNTER — OFFICE VISIT (OUTPATIENT)
Dept: PAIN MEDICINE | Facility: CLINIC | Age: 75
End: 2020-09-17
Payer: MEDICARE

## 2020-09-17 VITALS
WEIGHT: 129 LBS | HEART RATE: 83 BPM | HEIGHT: 59 IN | BODY MASS INDEX: 26 KG/M2 | SYSTOLIC BLOOD PRESSURE: 165 MMHG | DIASTOLIC BLOOD PRESSURE: 86 MMHG

## 2020-09-17 DIAGNOSIS — M47.892 OTHER SPONDYLOSIS, CERVICAL REGION: ICD-10-CM

## 2020-09-17 DIAGNOSIS — M79.18 MYOFASCIAL PAIN: ICD-10-CM

## 2020-09-17 DIAGNOSIS — M47.896 OTHER SPONDYLOSIS, LUMBAR REGION: ICD-10-CM

## 2020-09-17 DIAGNOSIS — R29.898 SEVERE MUSCLE DECONDITIONING: Primary | ICD-10-CM

## 2020-09-17 DIAGNOSIS — R53.1 ALTERATION IN MOBILITY DUE TO WEAKNESS: Primary | ICD-10-CM

## 2020-09-17 PROCEDURE — 99999 PR PBB SHADOW E&M-EST. PATIENT-LVL IV: CPT | Mod: PBBFAC,,, | Performed by: PHYSICIAN ASSISTANT

## 2020-09-17 PROCEDURE — 99214 PR OFFICE/OUTPT VISIT, EST, LEVL IV, 30-39 MIN: ICD-10-PCS | Mod: S$PBB,,, | Performed by: PHYSICIAN ASSISTANT

## 2020-09-17 PROCEDURE — 99214 OFFICE O/P EST MOD 30 MIN: CPT | Mod: PBBFAC,PN | Performed by: PHYSICIAN ASSISTANT

## 2020-09-17 PROCEDURE — 99999 PR PBB SHADOW E&M-EST. PATIENT-LVL IV: ICD-10-PCS | Mod: PBBFAC,,, | Performed by: PHYSICIAN ASSISTANT

## 2020-09-17 PROCEDURE — 99214 OFFICE O/P EST MOD 30 MIN: CPT | Mod: S$PBB,,, | Performed by: PHYSICIAN ASSISTANT

## 2020-09-17 RX ORDER — METHOCARBAMOL 500 MG/1
500 TABLET, FILM COATED ORAL EVERY 6 HOURS PRN
Qty: 60 TABLET | Refills: 0 | Status: SHIPPED | OUTPATIENT
Start: 2020-09-17 | End: 2020-10-02

## 2020-09-17 NOTE — PROGRESS NOTES
Referring Physician: No ref. provider found    PCP: [id number]      CC:  Right-sided neck pain and lower back pain    Interval history: Madhuri Manuel is a 75 y.o. female with both low back and neck pain who presents today for evaluation. 1 week ago she was bending down to get something out her refridgerateor and she felt a instant burning feeling in her low back. Pain has been constant and worse with positional changes. Denies any radiation into LE, b/b changes, or worsenign weakness. Pain is in her low back on the left. She had a IM injection at her PCP on Monday for the pain which did not provide any benefit. In the past she had cervical and lumbar MB RFA procedures which were helpful. This pain is different. Pain today is rated 8/10.]  Pt has been seen in the clinic before, however pt is new to me.     History below per Dr. Polk        Prior HPI:   Madhuri Manuel is a 75 y.o. female referred to us for right-sided neck pain. Pain has been present for 5 years.  She has history of cervical fusion in 2017.  She presents to us with constant burning, throbbing pain in her neck.  Pain radiates to her right shoulder.  Pain did radiate down her right arm.  She underwent a rig8/10.ht-sided changes.  cervical transforaminal epidural steroid injection on July 3, 2019 which help with her radiating right arm pain.  Neck and right shoulder pain still remains in his bothersome.  Pain worsens with lateral rotation, extension lifting.  Pain improves with rest.  She is referred back for spine surgery for consideration of cervical medial branch blocks.  She denies any worsening weakness.  No bowel bladder changes.    Interventional history:  Cervical PATRICK 07/2019   Right C3-C6 MB RFA 09/2019 with 50% relief.  Right L3-L5 MB RFA procedure with 50% relief    ROS:  CONSTITUTIONAL: No fevers, chills, night sweats, wt. loss, appetite changes  SKIN: no rashes or itching  ENT: No headaches, head trauma, vision changes, or  eye pain  LYMPH NODES: None noticed   CV: No chest pain, palpitations.   RESP: No shortness of breath, dyspnea on exertion, cough, wheezing, or hemoptysis  GI: No nausea, emesis, diarrhea, constipation, melena, hematochezia, pain.    : No dysuria, hematuria, urgency, or frequency   HEME: No easy bruising, bleeding problems  PSYCHIATRIC: No depression, anxiety, psychosis, hallucinations.  NEURO: No seizures, memory loss, dizziness or difficulty sleeping  MSK:  Positive HPI      Past Medical History:   Diagnosis Date    Anemia     Diabetes mellitus, type 2     Fracture of left foot     Hypertension     Neck pain     Osteoporosis      Past Surgical History:   Procedure Laterality Date    CERVICAL FUSION      gastric by pass      INJECTION OF ANESTHETIC AGENT AROUND MEDIAL BRANCH NERVES INNERVATING CERVICAL FACET JOINT Right 8/30/2019    Procedure: Block-nerve-medial branch-cervical;  Surgeon: Vasquez Polk MD;  Location: [facility];  Service: Pain Management;  Laterality: Right;  C3,4,5,6    INJECTION OF ANESTHETIC AGENT AROUND MEDIAL BRANCH NERVES INNERVATING LUMBAR FACET JOINT Right 10/3/2019    Procedure: Block-nerve-medial branch-lumbar;  Surgeon: Vasquez Polk MD;  Location: [facility];  Service: Pain Management;  Laterality: Right;  L4, L5    kidney stones      RADIOFREQUENCY ABLATION OF LUMBAR MEDIAL BRANCH NERVE AT SINGLE LEVEL Right 10/23/2019    Procedure: Radiofrequency Ablation, Nerve, Spinal, Lumbar, Medial Branch, L4,5;  Surgeon: Vasquez Polk MD;  Location: [facility];  Service: Pain Management;  Laterality: Right;  Burned at 80 degrees C X 60 seconds X 2 each site    RADIOFREQUENCY THERMOCOAGULATION Right 9/20/2019    Procedure: RADIOFREQUENCY THERMAL COAGULATION-CERVICAL;  Surgeon: Vasquez Polk MD;  Location: [facility];  Service: Pain Management;  Laterality: Right;  CERVIAL C 3,4,5,6 -- Burned at 80 degrees C. for 60 seconds each site    TRANSFORAMINAL EPIDURAL INJECTION OF STEROID Right 7/3/2019     "Procedure: Injection,steroid,epidural,transforaminal approach C3-4;  Surgeon: Vasquez Polk MD;  Location: [facility] OR;  Service: Pain Management;  Laterality: Right;     No family history on file.  Social History     Socioeconomic History    Marital status:      Spouse name: Not on file    Number of children: Not on file    Years of education: Not on file    Highest education level: Not on file   Occupational History    Not on file   Social Needs    Financial resource strain: Not on file    Food insecurity     Worry: Not on file     Inability: Not on file    Transportation needs     Medical: Not on file     Non-medical: Not on file   Tobacco Use    Smoking status: Never Smoker    Smokeless tobacco: Never Used   Substance and Sexual Activity    Alcohol use: Never     Frequency: Never    Drug use: Not on file    Sexual activity: Not on file   Lifestyle    Physical activity     Days per week: Not on file     Minutes per session: Not on file    Stress: Not on file   Relationships    Social connections     Talks on phone: Not on file     Gets together: Not on file     Attends Bahai service: Not on file     Active member of club or organization: Not on file     Attends meetings of clubs or organizations: Not on file     Relationship status: Not on file   Other Topics Concern    Not on file   Social History Narrative    Not on file         Medications/Allergies: See med card    Vitals:    09/17/20 0955   BP: (!) 165/86   Pulse: 83   Weight: 58.5 kg (129 lb)   Height: 4' 11" (1.499 m)   PainSc:   8   PainLoc: Back         Physical exam:    GENERAL: A and O x3, the patient appears well groomed and is in no acute distress.  Skin: No rashes or obvious lesions  HEENT: normocephalic, atraumatic  CARDIOVASCULAR:  RRR  LUNGS: non labored breathing  ABDOMEN: soft, nontender   UPPER EXTREMITIES: Normal alignment, normal range of motion, no atrophy, no skin changes,  hair growth and nail growth normal and " equal bilaterally. No swelling, no tenderness.    LOWER EXTREMITIES:  Normal alignment, normal range of motion, no atrophy, no skin changes,  hair growth and nail growth normal and equal bilaterally. No swelling, no tenderness.  CERVICAL and LUMBAR SPINE:  Cervical spine: ROM is limited in flexion, extension and lateral rotation with moderate increased pain.  Lumbar spine: ROM is limited in flexion, extension and lateral rotation with moderate increased pain.  Spurling's maneuver causes neck pain to right side.  Myofascial exam: No Tenderness to palpation across cervical paraspinous region bilaterally.Severe TTP left lumbar paraspinous region. Muscle atrophy noted.     MENTAL STATUS: normal orientation, speech, language, and fund of knowledge for social situation.  Emotional state appropriate.    CRANIAL NERVES:  II:  PERRL bilaterally,   III,IV,VI: EOMI.    V:  Facial sensation equal bilaterally  VII:  Facial motor function normal.  VIII:  Hearing equal to finger rub bilaterally  IX/X: Gag normal, palate symmetric  XI:  Shoulder shrug equal, head turn equal  XII:  Tongue midline without fasciculations      MOTOR: Tone and bulk: normal bilateral upper and lower Strength: normal   Delt Bi Tri WE WF     R 5 5 5 5 5 5   L 5 5 5 5 5 5     IP ADD ABD Quad TA Gas HAM  R 5 5 5 5 5 5 5  L 5 5 5 5 5 5 5    SENSATION: Light touch and pinprick intact bilaterally  REFLEXES: normal, symmetric, nonbrisk.  Toes down, no clonus. No hoffmans.  GAIT: walker      Imaging:  MRI C-spine 5/2019  There are surgical changes related to previous instrumented multilevel anterior  fusion with plate and screw fixation extending from C4 to C7. The vertebral  bodies are appropriately maintained in height. There is mild retrolisthesis at  C3-C4 and minimal anterolisthesis at C7-T1. There is mild wedge-shaped  configuration of the T3 vertebral body with Schmorl's node defects observed  within both the superior and inferior endplate. There is no  associated marrow  edema.    At C2-C3, shallow disc bulging and osteophytic ridging results in a mild degree  of mass effect upon the thecal sac towards the right of midline without  impingement of the cervical cord. Facet degenerative changes contribute to  minor right foraminal narrowing.    At C3-C4, there is a large extrusion of the disc margin extending from the far  right lateral canal into the proximal right neural foramen. This is  superimposed on broad-based disc bulging and posterior osteophytic ridging.  Focal mass effect upon the right side of the thecal sac contacts and slightly  deforms the cervical cord towards the right of midline. Facet degenerative  changes contribute to severe right foraminal stenosis. There is associated mild  signal alteration within the substance of the cord at this level, likely  reflection of myelomalacia. There is a mild-moderate degree of left foraminal  narrowing.    At C4-C5, shallow disc bulging and osteophytic ridging results in mild mass  effect upon the ventral margin of the thecal sac with asymmetry towards the  right of midline. Facet degenerative changes contribute to moderately severe  degrees of foraminal encroachment.    At C5-C6, posterior marginal osteophyte formation extends from the far right  lateral canal into the right neural foramen contributing to asymmetric mass  effect upon the thecal sac without direct cord impingement. There is severe  right foraminal stenosis. Facet degenerative changes contribute to moderately  severe left foraminal narrowing.    At C6-C7, shallow disc bulging results in mild asymmetric mass effect upon the  thecal sac towards the right of midline. Facet degenerative changes contribute  to moderately severe right foraminal and moderate left foraminal narrowing.    At C7-T1, there is no significant disc bulging or focal soft disc herniation.  Facet degenerative changes which contributes to mild degrees of  foraminal  narrowing.    Assessment:  Madhuri Manuel is a 75 y.o. female with =  1. Severe muscle deconditioning    2. Myofascial pain    3. Other spondylosis, cervical region    4. Other spondylosis, lumbar region          Plan:  1. I have stressed the importance of physical activity and exercise to improve overall health  2. Home health PT ordered  3. Robaxin 500 mg q 6 h prn   4. F/u s/p completion of PT

## 2020-10-12 ENCOUNTER — OFFICE VISIT (OUTPATIENT)
Dept: ORTHOPEDICS | Facility: CLINIC | Age: 75
End: 2020-10-12
Payer: MEDICARE

## 2020-10-12 VITALS
SYSTOLIC BLOOD PRESSURE: 140 MMHG | BODY MASS INDEX: 23.59 KG/M2 | HEART RATE: 107 BPM | DIASTOLIC BLOOD PRESSURE: 71 MMHG | WEIGHT: 117 LBS | HEIGHT: 59 IN

## 2020-10-12 DIAGNOSIS — M48.062 LUMBAR STENOSIS WITH NEUROGENIC CLAUDICATION: Primary | ICD-10-CM

## 2020-10-12 DIAGNOSIS — M51.36 DISC DEGENERATION, LUMBAR: ICD-10-CM

## 2020-10-12 DIAGNOSIS — M47.816 LUMBAR FACET ARTHROPATHY: ICD-10-CM

## 2020-10-12 PROCEDURE — 99213 OFFICE O/P EST LOW 20 MIN: CPT | Mod: S$GLB,,, | Performed by: ORTHOPAEDIC SURGERY

## 2020-10-12 PROCEDURE — 99213 PR OFFICE/OUTPT VISIT, EST, LEVL III, 20-29 MIN: ICD-10-PCS | Mod: S$GLB,,, | Performed by: ORTHOPAEDIC SURGERY

## 2020-10-12 RX ORDER — METFORMIN HYDROCHLORIDE 1000 MG/1
TABLET ORAL
COMMUNITY
Start: 2020-08-17

## 2020-10-12 RX ORDER — DULAGLUTIDE 0.75 MG/.5ML
INJECTION, SOLUTION SUBCUTANEOUS
Status: ON HOLD | COMMUNITY
Start: 2020-09-03 | End: 2020-12-04

## 2020-10-12 RX ORDER — ESCITALOPRAM OXALATE 10 MG/1
20 TABLET ORAL
COMMUNITY
Start: 2020-10-06 | End: 2021-07-30

## 2020-10-12 RX ORDER — HYDROCODONE BITARTRATE AND ACETAMINOPHEN 5; 325 MG/1; MG/1
1 TABLET ORAL EVERY 8 HOURS PRN
Qty: 21 TABLET | Refills: 0 | Status: SHIPPED | OUTPATIENT
Start: 2020-10-12 | End: 2020-10-19

## 2020-10-12 NOTE — PROGRESS NOTES
Subjective:       Patient ID: Madhuri Manuel is a 75 y.o. female.    Chief Complaint: Pain of the Lumbar Spine (Saw PCP last Tuesday, pain is across back, left is worse than right, pain down b/l legs to knees, left leg gets cold, no numbness, PCP ordered XR. Told her to f/u with Dr Diaz, saw Dr Polk PA, ordered home health PT)      History of Present Illness  History as above.  She does have some pain in her back going to both legs.  Was seen by physician's assistant and [facility] and physical therapy ordered but was unsuccessful.  Has had lumbar injections in the past which were beneficial according to medical records    Current Medications  Current Outpatient Medications   Medication Sig Dispense Refill    aspirin 81 MG Chew Take 1 tablet by mouth.      atorvastatin (LIPITOR) 80 MG tablet       CALCITRATE-VITAMIN D 315-250 mg-unit Tab       calcium-vitamin D 600 mg(1,500mg) -400 unit Tab       carboxymethylcell-glycerin,PF, (REFRESH OPTIVE SENSITIVE, PF,) 0.5-0.9 % Dpet       cholecalciferol, vitamin D3, (VITAMIN D3) 2,000 unit Tab       escitalopram oxalate (LEXAPRO) 10 MG tablet       FREESTYLE LITE STRIPS Strp       losartan (COZAAR) 25 MG tablet       magnesium oxide (MAG-OX) 400 mg (241.3 mg magnesium) tablet       metFORMIN (GLUCOPHAGE) 1000 MG tablet       omeprazole (PRILOSEC) 20 MG capsule       PAZEO 0.7 % Drop       REFRESH TEARS 0.5 % Drop       traZODone (DESYREL) 50 MG tablet       TRULICITY 0.75 mg/0.5 mL pen injector       VITAMIN B-6 50 MG tablet       VOLTAREN 1 % Gel       zoledronic acid-mannitol & water (RECLAST) 5 mg/100 mL PgBk Inject 5 mg into the vein.       No current facility-administered medications for this visit.        Allergies  Review of patient's allergies indicates:   Allergen Reactions    Iodinated contrast media Hives    Penicillins Rash     started after taking 8-10 days of augmentin. She did have a reclast injection in the last 24 hours also but has  never had problems with this.      Latex Rash       Past Medical History  Past Medical History:   Diagnosis Date    Anemia     Diabetes mellitus, type 2     Fracture of left foot     Hypertension     Neck pain     Osteoporosis        Surgical History  Past Surgical History:   Procedure Laterality Date    CERVICAL FUSION      gastric by pass      INJECTION OF ANESTHETIC AGENT AROUND MEDIAL BRANCH NERVES INNERVATING CERVICAL FACET JOINT Right 8/30/2019    Procedure: Block-nerve-medial branch-cervical;  Surgeon: Vasquez Polk MD;  Location: [facility];  Service: Pain Management;  Laterality: Right;  C3,4,5,6    INJECTION OF ANESTHETIC AGENT AROUND MEDIAL BRANCH NERVES INNERVATING LUMBAR FACET JOINT Right 10/3/2019    Procedure: Block-nerve-medial branch-lumbar;  Surgeon: Vasquez Polk MD;  Location: [facility];  Service: Pain Management;  Laterality: Right;  L4, L5    kidney stones      RADIOFREQUENCY ABLATION OF LUMBAR MEDIAL BRANCH NERVE AT SINGLE LEVEL Right 10/23/2019    Procedure: Radiofrequency Ablation, Nerve, Spinal, Lumbar, Medial Branch, L4,5;  Surgeon: Vasquez Polk MD;  Location: [facility];  Service: Pain Management;  Laterality: Right;  Burned at 80 degrees C X 60 seconds X 2 each site    RADIOFREQUENCY THERMOCOAGULATION Right 9/20/2019    Procedure: RADIOFREQUENCY THERMAL COAGULATION-CERVICAL;  Surgeon: Vasquez Polk MD;  Location: [facility];  Service: Pain Management;  Laterality: Right;  CERVIAL C 3,4,5,6 -- Burned at 80 degrees C. for 60 seconds each site    TRANSFORAMINAL EPIDURAL INJECTION OF STEROID Right 7/3/2019    Procedure: Injection,steroid,epidural,transforaminal approach C3-4;  Surgeon: Vasquez Polk MD;  Location: [facility];  Service: Pain Management;  Laterality: Right;       Family History:   History reviewed. No pertinent family history.    Social History:   Social History     Socioeconomic History    Marital status:      Spouse name: Not on file    Number of children: Not on file     Years of education: Not on file    Highest education level: Not on file   Occupational History    Not on file   Social Needs    Financial resource strain: Not on file    Food insecurity     Worry: Not on file     Inability: Not on file    Transportation needs     Medical: Not on file     Non-medical: Not on file   Tobacco Use    Smoking status: Never Smoker    Smokeless tobacco: Never Used   Substance and Sexual Activity    Alcohol use: Never     Frequency: Never    Drug use: Not on file    Sexual activity: Not on file   Lifestyle    Physical activity     Days per week: Not on file     Minutes per session: Not on file    Stress: Not on file   Relationships    Social connections     Talks on phone: Not on file     Gets together: Not on file     Attends [employer] service: Not on file     Active member of club or organization: Not on file     Attends meetings of clubs or organizations: Not on file     Relationship status: Not on file   Other Topics Concern    Not on file   Social History Narrative    Not on file       Hospitalization/Major Diagnostic Procedure:     Review of Systems     General/Constitutional:  Chills denies. Fatigue denies. Fever denies. Weight gain denies. Weight loss denies.    Respiratory:  Shortness of breath denies.    Cardiovascular:  Chest pain denies.    Gastrointestinal:  Constipation denies. Diarrhea denies. Nausea denies. Vomiting denies.     Hematology:  Easy bruising denies. Prolonged bleeding denies.     Genitourinary:  Frequent urination denies. Pain in lower back denies. Painful urination denies.     Musculoskeletal:  See HPI for details    Skin:  Rash denies.    Neurologic:  Dizziness denies. Gait abnormalities denies. Seizures denies. Tingling/Numbess denies.    Psychiatric:  Anxiety denies. Depressed mood denies.     Objective:   Vital Signs:   Vitals:    10/12/20 0953   BP: (!) 140/71   Pulse: 107        Physical Exam      General Examination:     Constitutional: The  patient is alert and oriented to lace person and time. Mood is pleasant.     Head/Face: Normal facial features normal eyebrows    Eyes: Normal extraocular motion bilaterally    Lungs: Respirations are equal and unlabored    Gait is coordinated.    Cardiovascular: There are no swelling or varicosities present.    Lymphatic: Negative for adenopathy    Skin: Normal    Neurological: Level of consciousness normal. Oriented to place person and time and situation    Psychiatric: Oriented to time place person and situation    Patient stand erect walks with an rolling walker pain with standing erect range of motion is markedly restricted no spasm noted diffuse tenderness paraspinous muscles L4-S1 left side greater than right straight leg raising causes back pain hip and knee range of motion normal peripheral pulses intact  XRAY Report/ Interpretation:  AP and lateral x-rays of lumbar spine will performed today. Indications low back pain. Findings:  Mild disc space narrowing L5-S1 multilevel facet joint arthritis L3 to sacrum      Assessment:       1. Lumbar stenosis with neurogenic claudication    2. Disc degeneration, lumbar    3. Lumbar facet arthropathy        Plan:       Madhuri was seen today for pain.    Diagnoses and all orders for this visit:    Lumbar stenosis with neurogenic claudication    Disc degeneration, lumbar  -     X-Ray Lumbar Spine Ap And Lateral    Lumbar facet arthropathy         No follow-ups on file.    Lumbar MRI advised patient may be candidate for lumbar epidural steroid injection short term pain medications given no refills    This note was created using Dragon voice recognition software that occasionally misinterpreted phrases or words.

## 2020-10-13 ENCOUNTER — TELEPHONE (OUTPATIENT)
Dept: PAIN MEDICINE | Facility: CLINIC | Age: 75
End: 2020-10-13

## 2020-10-13 NOTE — TELEPHONE ENCOUNTER
----- Message from Milagros Izquierdo sent at 10/13/2020  3:28 PM CDT -----  Contact: reggie  Type: Needs Medical Advice    Who Called:  Reggie Physical therapy with [facility]  Best Call Back Number: [phone]  Additional Information: Requesting a call back regarding therapy stated pt made limited progress and pt is requesting to pe discharge so they are discharging her  Please Advise ---Thank you

## 2020-10-14 ENCOUNTER — HOSPITAL ENCOUNTER (OUTPATIENT)
Dept: RADIOLOGY | Facility: HOSPITAL | Age: 75
Discharge: HOME | End: 2020-10-14
Attending: ORTHOPAEDIC SURGERY
Payer: MEDICARE

## 2020-10-14 DIAGNOSIS — M47.816 LUMBAR FACET ARTHROPATHY: ICD-10-CM

## 2020-10-14 DIAGNOSIS — M48.062 LUMBAR STENOSIS WITH NEUROGENIC CLAUDICATION: ICD-10-CM

## 2020-10-14 DIAGNOSIS — M51.36 DISC DEGENERATION, LUMBAR: ICD-10-CM

## 2020-10-14 PROCEDURE — 72148 MRI LUMBAR SPINE W/O DYE: CPT | Mod: TC,PO

## 2020-10-21 ENCOUNTER — OFFICE VISIT (OUTPATIENT)
Dept: ORTHOPEDICS | Facility: CLINIC | Age: 75
End: 2020-10-21
Payer: MEDICARE

## 2020-10-21 VITALS
SYSTOLIC BLOOD PRESSURE: 120 MMHG | HEIGHT: 59 IN | DIASTOLIC BLOOD PRESSURE: 74 MMHG | WEIGHT: 117 LBS | BODY MASS INDEX: 23.59 KG/M2 | HEART RATE: 80 BPM

## 2020-10-21 DIAGNOSIS — M51.36 DISC DEGENERATION, LUMBAR: Primary | ICD-10-CM

## 2020-10-21 DIAGNOSIS — M48.061 LUMBAR FORAMINAL STENOSIS: ICD-10-CM

## 2020-10-21 DIAGNOSIS — M71.38 SYNOVIAL CYST OF LUMBAR FACET JOINT: ICD-10-CM

## 2020-10-21 DIAGNOSIS — M47.816 LUMBAR FACET ARTHROPATHY: ICD-10-CM

## 2020-10-21 PROCEDURE — 99214 PR OFFICE/OUTPT VISIT, EST, LEVL IV, 30-39 MIN: ICD-10-PCS | Mod: S$GLB,,, | Performed by: ORTHOPAEDIC SURGERY

## 2020-10-21 PROCEDURE — 99214 OFFICE O/P EST MOD 30 MIN: CPT | Mod: S$GLB,,, | Performed by: ORTHOPAEDIC SURGERY

## 2020-10-21 NOTE — PROGRESS NOTES
Subjective:       Patient ID: Madhuri Manuel is a 75 y.o. female.    Chief Complaint: Pain of the Lumbar Spine (MRI lumbar results, she still has some residual intermittent radiating leg pain, left greater than right)      History of Present Illness  Patient is here to follow-up for significant left lumbar lumbosacral pain with some left lower extremity radiculitis that started about 5 weeks ago.  She has an updated lumbar MRI to review.    Current Medications  Current Outpatient Medications   Medication Sig Dispense Refill    aspirin 81 MG Chew Take 1 tablet by mouth.      atorvastatin (LIPITOR) 80 MG tablet       CALCITRATE-VITAMIN D 315-250 mg-unit Tab       calcium-vitamin D 600 mg(1,500mg) -400 unit Tab       carboxymethylcell-glycerin,PF, (REFRESH OPTIVE SENSITIVE, PF,) 0.5-0.9 % Dpet       cholecalciferol, vitamin D3, (VITAMIN D3) 2,000 unit Tab       escitalopram oxalate (LEXAPRO) 10 MG tablet       FREESTYLE LITE STRIPS Strp       losartan (COZAAR) 25 MG tablet       magnesium oxide (MAG-OX) 400 mg (241.3 mg magnesium) tablet       metFORMIN (GLUCOPHAGE) 1000 MG tablet       omeprazole (PRILOSEC) 20 MG capsule       PAZEO 0.7 % Drop       REFRESH TEARS 0.5 % Drop       traZODone (DESYREL) 50 MG tablet       TRULICITY 0.75 mg/0.5 mL pen injector       VITAMIN B-6 50 MG tablet       VOLTAREN 1 % Gel       zoledronic acid-mannitol & water (RECLAST) 5 mg/100 mL PgBk Inject 5 mg into the vein.       No current facility-administered medications for this visit.        Allergies  Review of patient's allergies indicates:   Allergen Reactions    Iodinated contrast media Hives    Penicillins Rash     started after taking 8-10 days of augmentin. She did have a reclast injection in the last 24 hours also but has never had problems with this.      Latex Rash       Past Medical History  Past Medical History:   Diagnosis Date    Anemia     Diabetes mellitus, type 2     Fracture of left foot      Hypertension     Neck pain     Osteoporosis        Surgical History  Past Surgical History:   Procedure Laterality Date    CERVICAL FUSION      gastric by pass      INJECTION OF ANESTHETIC AGENT AROUND MEDIAL BRANCH NERVES INNERVATING CERVICAL FACET JOINT Right 8/30/2019    Procedure: Block-nerve-medial branch-cervical;  Surgeon: Vasquez Polk MD;  Location: [facility];  Service: Pain Management;  Laterality: Right;  C3,4,5,6    INJECTION OF ANESTHETIC AGENT AROUND MEDIAL BRANCH NERVES INNERVATING LUMBAR FACET JOINT Right 10/3/2019    Procedure: Block-nerve-medial branch-lumbar;  Surgeon: Vasquez Polk MD;  Location: [facility];  Service: Pain Management;  Laterality: Right;  L4, L5    kidney stones      RADIOFREQUENCY ABLATION OF LUMBAR MEDIAL BRANCH NERVE AT SINGLE LEVEL Right 10/23/2019    Procedure: Radiofrequency Ablation, Nerve, Spinal, Lumbar, Medial Branch, L4,5;  Surgeon: Vasquez Polk MD;  Location: [facility];  Service: Pain Management;  Laterality: Right;  Burned at 80 degrees C X 60 seconds X 2 each site    RADIOFREQUENCY THERMOCOAGULATION Right 9/20/2019    Procedure: RADIOFREQUENCY THERMAL COAGULATION-CERVICAL;  Surgeon: Vasquez Polk MD;  Location: [facility];  Service: Pain Management;  Laterality: Right;  CERVIAL C 3,4,5,6 -- Burned at 80 degrees C. for 60 seconds each site    TRANSFORAMINAL EPIDURAL INJECTION OF STEROID Right 7/3/2019    Procedure: Injection,steroid,epidural,transforaminal approach C3-4;  Surgeon: Vasquez Polk MD;  Location: [facility];  Service: Pain Management;  Laterality: Right;       Family History:   History reviewed. No pertinent family history.    Social History:   Social History     Socioeconomic History    Marital status:      Spouse name: Not on file    Number of children: Not on file    Years of education: Not on file    Highest education level: Not on file   Occupational History    Not on file   Social Needs    Financial resource strain: Not on file    Food insecurity      Worry: Not on file     Inability: Not on file    Transportation needs     Medical: Not on file     Non-medical: Not on file   Tobacco Use    Smoking status: Never Smoker    Smokeless tobacco: Never Used   Substance and Sexual Activity    Alcohol use: Never     Frequency: Never    Drug use: Not on file    Sexual activity: Not on file   Lifestyle    Physical activity     Days per week: Not on file     Minutes per session: Not on file    Stress: Not on file   Relationships    Social connections     Talks on phone: Not on file     Gets together: Not on file     Attends Sabianism service: Not on file     Active member of club or organization: Not on file     Attends meetings of clubs or organizations: Not on file     Relationship status: Not on file   Other Topics Concern    Not on file   Social History Narrative    Not on file       Hospitalization/Major Diagnostic Procedure:     Review of Systems     General/Constitutional:  Chills denies. Fatigue denies. Fever denies. Weight gain denies. Weight loss denies.    Respiratory:  Shortness of breath denies.    Cardiovascular:  Chest pain denies.    Gastrointestinal:  Constipation denies. Diarrhea denies. Nausea denies. Vomiting denies.     Hematology:  Easy bruising denies. Prolonged bleeding denies.     Genitourinary:  Frequent urination denies. Pain in lower back denies. Painful urination denies.     Musculoskeletal:  See HPI for details    Skin:  Rash denies.    Neurologic:  Dizziness denies. Gait abnormalities denies. Seizures denies. Tingling/Numbess denies.    Psychiatric:  Anxiety denies. Depressed mood denies.     Objective:   Vital Signs:   Vitals:    10/21/20 1000   BP: 120/74   Pulse: 80        Physical Exam      General Examination:     Constitutional: The patient is alert and oriented to lace person and time. Mood is pleasant.     Head/Face: Normal facial features normal eyebrows    Eyes: Normal extraocular motion bilaterally    Lungs:  "Respirations are equal and unlabored    Gait is coordinated.    Cardiovascular: There are no swelling or varicosities present.    Lymphatic: Negative for adenopathy    Skin: Normal    Neurological: Level of consciousness normal. Oriented to place person and time and situation    Psychiatric: Oriented to time place person and situation    Patient stand erect walks with an rolling walker pain with standing erect range of motion is markedly restricted no spasm noted diffuse tenderness paraspinous muscles L4-S1 left side greater than right straight leg raising causes back pain hip and knee range of motion normal peripheral pulses intact    XRAY Report/ Interpretation:  Lumbar MRI reviewed with the patient in the office today demonstrates some thoracolumbar degenerative disc disease with retrolisthesis of T12 on L1.  There appears to be a chronic inferior plate compression fracture of L3 which is unusual but is also a disc bulge at L3-4 and some facet arthropathy with a synovial cyst on the left causing significant bilateral foraminal stenosis left greater than right.  L4-5 disc degeneration and disc bulge with facet arthropathy as well and the same could be seen at L5-S1 but more right side predominant.      Assessment:       1. Disc degeneration, lumbar    2. Lumbar facet arthropathy    3. Synovial cyst of lumbar facet joint    4. Lumbar foraminal stenosis        Plan:       Madhuri was seen today for pain.    Diagnoses and all orders for this visit:    Disc degeneration, lumbar    Lumbar facet arthropathy    Synovial cyst of lumbar facet joint  Comments:  Left L3-4    Lumbar foraminal stenosis         No follow-ups on file.  Sukh Moscoso, physician's assistant served in the capacity as a "scribe" for this patient encounter  A "face to face" encounter occurred with Dr. Diaz on this date  The treatment plan and medical decision making is outlined below:  At this time recommend referral back to her established pain " management physician, Dr. Polk, to proceed with lumbar epidural steroid injections probably on the left at L3-4.  Follow-up in 6 weeks or sooner if needed.    This note was created using Dragon voice recognition software that occasionally misinterpreted phrases or words.

## 2020-10-22 ENCOUNTER — OFFICE VISIT (OUTPATIENT)
Dept: PAIN MEDICINE | Facility: CLINIC | Age: 75
End: 2020-10-22
Payer: MEDICARE

## 2020-10-22 VITALS
DIASTOLIC BLOOD PRESSURE: 73 MMHG | BODY MASS INDEX: 23.59 KG/M2 | HEART RATE: 95 BPM | HEIGHT: 59 IN | SYSTOLIC BLOOD PRESSURE: 128 MMHG | WEIGHT: 117 LBS

## 2020-10-22 DIAGNOSIS — M51.36 DDD (DEGENERATIVE DISC DISEASE), LUMBAR: Primary | ICD-10-CM

## 2020-10-22 DIAGNOSIS — M47.896 OTHER SPONDYLOSIS, LUMBAR REGION: ICD-10-CM

## 2020-10-22 DIAGNOSIS — M54.16 LUMBAR RADICULITIS: Primary | ICD-10-CM

## 2020-10-22 DIAGNOSIS — M54.16 LUMBAR RADICULITIS: ICD-10-CM

## 2020-10-22 DIAGNOSIS — Z01.818 PRE-OP TESTING: ICD-10-CM

## 2020-10-22 PROCEDURE — 99999 PR PBB SHADOW E&M-EST. PATIENT-LVL IV: ICD-10-PCS | Mod: PBBFAC,,, | Performed by: ANESTHESIOLOGY

## 2020-10-22 PROCEDURE — 99214 OFFICE O/P EST MOD 30 MIN: CPT | Mod: PBBFAC,PN | Performed by: ANESTHESIOLOGY

## 2020-10-22 PROCEDURE — 99214 OFFICE O/P EST MOD 30 MIN: CPT | Mod: S$PBB,,, | Performed by: ANESTHESIOLOGY

## 2020-10-22 PROCEDURE — 99999 PR PBB SHADOW E&M-EST. PATIENT-LVL IV: CPT | Mod: PBBFAC,,, | Performed by: ANESTHESIOLOGY

## 2020-10-22 PROCEDURE — 99214 PR OFFICE/OUTPT VISIT, EST, LEVL IV, 30-39 MIN: ICD-10-PCS | Mod: S$PBB,,, | Performed by: ANESTHESIOLOGY

## 2020-10-22 NOTE — H&P (VIEW-ONLY)
Referring Physician: Wilver Diaz MD    PCP: [id number]      CC:  Low back and leg pain    Interval history: Madhuri Manuel is a 75 y.o. female with both low back and neck pain who returns our clinic.  Over month ago, she was bending down to get something out her refridgerateor and she felt a instant burning feeling in her low back. Pain has been constant and worse with positional changes. Denies any radiation into LE, b/b changes, or worsenign weakness. Pain is in her low back on the left. She had a IM injection at her PCP on Monday for the pain which did not provide any benefit. In the past she had cervical and lumbar MB RFA procedures which were helpful. This pain is different.  She was recently seen by spine surgery in updated lumbar MRI was performed.  She was referred back to us for consideration of a lumbar epidural steroid injection.  Prior HPI:   Madhuri Manuel is a 75 y.o. female referred to us for right-sided neck pain. Pain has been present for 5 years.  She has history of cervical fusion in 2017.  She presents to us with constant burning, throbbing pain in her neck.  Pain radiates to her right shoulder.  Pain did radiate down her right arm.  She underwent a rig8/10.ht-sided changes.  cervical transforaminal epidural steroid injection on July 3, 2019 which help with her radiating right arm pain.  Neck and right shoulder pain still remains in his bothersome.  Pain worsens with lateral rotation, extension lifting.  Pain improves with rest.  She is referred back for spine surgery for consideration of cervical medial branch blocks.  She denies any worsening weakness.  No bowel bladder changes.    Interventional history:  Cervical PATRICK 07/2019   Right C3-C6 MB RFA 09/2019 with 50% relief.  Right L3-L5 MB RFA procedure with 50% relief    ROS:  CONSTITUTIONAL: No fevers, chills, night sweats, wt. loss, appetite changes  SKIN: no rashes or itching  ENT: No headaches, head trauma, vision  changes, or eye pain  LYMPH NODES: None noticed   CV: No chest pain, palpitations.   RESP: No shortness of breath, dyspnea on exertion, cough, wheezing, or hemoptysis  GI: No nausea, emesis, diarrhea, constipation, melena, hematochezia, pain.    : No dysuria, hematuria, urgency, or frequency   HEME: No easy bruising, bleeding problems  PSYCHIATRIC: No depression, anxiety, psychosis, hallucinations.  NEURO: No seizures, memory loss, dizziness or difficulty sleeping  MSK:  Positive HPI      Past Medical History:   Diagnosis Date    Anemia     Diabetes mellitus, type 2     Fracture of left foot     Hypertension     Neck pain     Osteoporosis      Past Surgical History:   Procedure Laterality Date    CERVICAL FUSION      gastric by pass      INJECTION OF ANESTHETIC AGENT AROUND MEDIAL BRANCH NERVES INNERVATING CERVICAL FACET JOINT Right 8/30/2019    Procedure: Block-nerve-medial branch-cervical;  Surgeon: Vasquez Polk MD;  Location: [facility];  Service: Pain Management;  Laterality: Right;  C3,4,5,6    INJECTION OF ANESTHETIC AGENT AROUND MEDIAL BRANCH NERVES INNERVATING LUMBAR FACET JOINT Right 10/3/2019    Procedure: Block-nerve-medial branch-lumbar;  Surgeon: Vasquez Polk MD;  Location: [facility];  Service: Pain Management;  Laterality: Right;  L4, L5    kidney stones      RADIOFREQUENCY ABLATION OF LUMBAR MEDIAL BRANCH NERVE AT SINGLE LEVEL Right 10/23/2019    Procedure: Radiofrequency Ablation, Nerve, Spinal, Lumbar, Medial Branch, L4,5;  Surgeon: Vasquez Polk MD;  Location: [facility];  Service: Pain Management;  Laterality: Right;  Burned at 80 degrees C X 60 seconds X 2 each site    RADIOFREQUENCY THERMOCOAGULATION Right 9/20/2019    Procedure: RADIOFREQUENCY THERMAL COAGULATION-CERVICAL;  Surgeon: Vasquez Polk MD;  Location: [facility];  Service: Pain Management;  Laterality: Right;  CERVIAL C 3,4,5,6 -- Burned at 80 degrees C. for 60 seconds each site    TRANSFORAMINAL EPIDURAL INJECTION OF STEROID Right  "7/3/2019    Procedure: Injection,steroid,epidural,transforaminal approach C3-4;  Surgeon: Vasquez Polk MD;  Location: [facility];  Service: Pain Management;  Laterality: Right;     No family history on file.  Social History     Socioeconomic History    Marital status:      Spouse name: Not on file    Number of children: Not on file    Years of education: Not on file    Highest education level: Not on file   Occupational History    Not on file   Social Needs    Financial resource strain: Not on file    Food insecurity     Worry: Not on file     Inability: Not on file    Transportation needs     Medical: Not on file     Non-medical: Not on file   Tobacco Use    Smoking status: Never Smoker    Smokeless tobacco: Never Used   Substance and Sexual Activity    Alcohol use: Never     Frequency: Never    Drug use: Not on file    Sexual activity: Not on file   Lifestyle    Physical activity     Days per week: Not on file     Minutes per session: Not on file    Stress: Not on file   Relationships    Social connections     Talks on phone: Not on file     Gets together: Not on file     Attends Yazidism service: Not on file     Active member of club or organization: Not on file     Attends meetings of clubs or organizations: Not on file     Relationship status: Not on file   Other Topics Concern    Not on file   Social History Narrative    Not on file         Medications/Allergies: See med card    Vitals:    10/22/20 1031   BP: 128/73   Pulse: 95   Weight: 53.1 kg (117 lb)   Height: 4' 11" (1.499 m)   PainSc: 10-Worst pain ever   PainLoc: Back         Physical exam:    GENERAL: A and O x3, the patient appears well groomed and is in no acute distress.  Skin: No rashes or obvious lesions  HEENT: normocephalic, atraumatic  CARDIOVASCULAR:  RRR  LUNGS: non labored breathing  ABDOMEN: soft, nontender   UPPER EXTREMITIES: Normal alignment, normal range of motion, no atrophy, no skin changes,  hair growth and nail " growth normal and equal bilaterally. No swelling, no tenderness.    LOWER EXTREMITIES:  Normal alignment, normal range of motion, no atrophy, no skin changes,  hair growth and nail growth normal and equal bilaterally. No swelling, no tenderness.  CERVICAL and LUMBAR SPINE:  Cervical spine: ROM is limited in flexion, extension and lateral rotation with moderate increased pain.  Lumbar spine: ROM is limited in flexion, extension and lateral rotation with moderate increased pain.  Spurling's maneuver causes neck pain to right side.  Myofascial exam: No Tenderness to palpation across cervical paraspinous region bilaterally.Severe TTP left lumbar paraspinous region. Muscle atrophy noted.     MENTAL STATUS: normal orientation, speech, language, and fund of knowledge for social situation.  Emotional state appropriate.    CRANIAL NERVES:  II:  PERRL bilaterally,   III,IV,VI: EOMI.    V:  Facial sensation equal bilaterally  VII:  Facial motor function normal.  VIII:  Hearing equal to finger rub bilaterally  IX/X: Gag normal, palate symmetric  XI:  Shoulder shrug equal, head turn equal  XII:  Tongue midline without fasciculations      MOTOR: Tone and bulk: normal bilateral upper and lower Strength: normal   Delt Bi Tri WE WF     R 5 5 5 5 5 5   L 5 5 5 5 5 5     IP ADD ABD Quad TA Gas HAM  R 5 5 5 5 5 5 5  L 5 5 5 5 5 5 5    SENSATION: Light touch and pinprick intact bilaterally  REFLEXES: normal, symmetric, nonbrisk.  Toes down, no clonus. No hoffmans.  GAIT: walker      Imaging:  MRI C-spine 5/2019  There are surgical changes related to previous instrumented multilevel anterior  fusion with plate and screw fixation extending from C4 to C7. The vertebral  bodies are appropriately maintained in height. There is mild retrolisthesis at  C3-C4 and minimal anterolisthesis at C7-T1. There is mild wedge-shaped  configuration of the T3 vertebral body with Schmorl's node defects observed  within both the superior and inferior  endplate. There is no associated marrow  edema.    At C2-C3, shallow disc bulging and osteophytic ridging results in a mild degree  of mass effect upon the thecal sac towards the right of midline without  impingement of the cervical cord. Facet degenerative changes contribute to  minor right foraminal narrowing.    At C3-C4, there is a large extrusion of the disc margin extending from the far  right lateral canal into the proximal right neural foramen. This is  superimposed on broad-based disc bulging and posterior osteophytic ridging.  Focal mass effect upon the right side of the thecal sac contacts and slightly  deforms the cervical cord towards the right of midline. Facet degenerative  changes contribute to severe right foraminal stenosis. There is associated mild  signal alteration within the substance of the cord at this level, likely  reflection of myelomalacia. There is a mild-moderate degree of left foraminal  narrowing.    At C4-C5, shallow disc bulging and osteophytic ridging results in mild mass  effect upon the ventral margin of the thecal sac with asymmetry towards the  right of midline. Facet degenerative changes contribute to moderately severe  degrees of foraminal encroachment.    At C5-C6, posterior marginal osteophyte formation extends from the far right  lateral canal into the right neural foramen contributing to asymmetric mass  effect upon the thecal sac without direct cord impingement. There is severe  right foraminal stenosis. Facet degenerative changes contribute to moderately  severe left foraminal narrowing.    At C6-C7, shallow disc bulging results in mild asymmetric mass effect upon the  thecal sac towards the right of midline. Facet degenerative changes contribute  to moderately severe right foraminal and moderate left foraminal narrowing.    At C7-T1, there is no significant disc bulging or focal soft disc herniation.  Facet degenerative changes which contributes to mild degrees of  foraminal  narrowing.    MRI lumbar spine 10/14/20  1. Chronic L3 inferior endplate compression fracture with 3 mm of  retropulsion.  2. Moderate to severe central canal narrowing at L3-L4.  3. 5 mm T2 hyperintense focus intimately associated with left L3-L4  facet joint, most consistent with a synovial cyst, resulting in  compression of left L4 nerve roots in the lateral recess. Correlation  for left L4 radiculopathy is requested.  4. Grade 1 anterolisthesis of L5 on S1 due to severe bilateral facet  joint osteoarthrosis.  5. Severe left and moderate to severe right L5-S1 neural foramen  narrowing. Correlation for bilateral L5 radiculopathies is requested.  6. Additional lumbar spine degenerative changes as described    Assessment:  Madhuri Manuel is a 75 y.o. female with   1. DDD (degenerative disc disease), lumbar    2. Lumbar radiculitis    3. Other spondylosis, lumbar region          Plan:  1. I have stressed the importance of physical activity and exercise to improve overall health  2. Reviewed pertinent imaging and records with patient  3. I think that the patient's back pain and radicular leg symptoms are due to degenerative disc disease and have recommended a lumbar epidural steroid injection to the Left  L3-4, L4-5 level(s).  4. Follow up after procedure

## 2020-10-22 NOTE — PROGRESS NOTES
Referring Physician: Wilver Diaz MD    PCP: [id number]      CC:  Low back and leg pain    Interval history: Madhuri Manuel is a 75 y.o. female with both low back and neck pain who returns our clinic.  Over month ago, she was bending down to get something out her refridgerateor and she felt a instant burning feeling in her low back. Pain has been constant and worse with positional changes. Denies any radiation into LE, b/b changes, or worsenign weakness. Pain is in her low back on the left. She had a IM injection at her PCP on Monday for the pain which did not provide any benefit. In the past she had cervical and lumbar MB RFA procedures which were helpful. This pain is different.  She was recently seen by spine surgery in updated lumbar MRI was performed.  She was referred back to us for consideration of a lumbar epidural steroid injection.  Prior HPI:   Madhuri Manuel is a 75 y.o. female referred to us for right-sided neck pain. Pain has been present for 5 years.  She has history of cervical fusion in 2017.  She presents to us with constant burning, throbbing pain in her neck.  Pain radiates to her right shoulder.  Pain did radiate down her right arm.  She underwent a rig8/10.ht-sided changes.  cervical transforaminal epidural steroid injection on July 3, 2019 which help with her radiating right arm pain.  Neck and right shoulder pain still remains in his bothersome.  Pain worsens with lateral rotation, extension lifting.  Pain improves with rest.  She is referred back for spine surgery for consideration of cervical medial branch blocks.  She denies any worsening weakness.  No bowel bladder changes.    Interventional history:  Cervical PATRICK 07/2019   Right C3-C6 MB RFA 09/2019 with 50% relief.  Right L3-L5 MB RFA procedure with 50% relief    ROS:  CONSTITUTIONAL: No fevers, chills, night sweats, wt. loss, appetite changes  SKIN: no rashes or itching  ENT: No headaches, head trauma, vision  changes, or eye pain  LYMPH NODES: None noticed   CV: No chest pain, palpitations.   RESP: No shortness of breath, dyspnea on exertion, cough, wheezing, or hemoptysis  GI: No nausea, emesis, diarrhea, constipation, melena, hematochezia, pain.    : No dysuria, hematuria, urgency, or frequency   HEME: No easy bruising, bleeding problems  PSYCHIATRIC: No depression, anxiety, psychosis, hallucinations.  NEURO: No seizures, memory loss, dizziness or difficulty sleeping  MSK:  Positive HPI      Past Medical History:   Diagnosis Date    Anemia     Diabetes mellitus, type 2     Fracture of left foot     Hypertension     Neck pain     Osteoporosis      Past Surgical History:   Procedure Laterality Date    CERVICAL FUSION      gastric by pass      INJECTION OF ANESTHETIC AGENT AROUND MEDIAL BRANCH NERVES INNERVATING CERVICAL FACET JOINT Right 8/30/2019    Procedure: Block-nerve-medial branch-cervical;  Surgeon: Vasquez Polk MD;  Location: [facility];  Service: Pain Management;  Laterality: Right;  C3,4,5,6    INJECTION OF ANESTHETIC AGENT AROUND MEDIAL BRANCH NERVES INNERVATING LUMBAR FACET JOINT Right 10/3/2019    Procedure: Block-nerve-medial branch-lumbar;  Surgeon: Vasquez Polk MD;  Location: [facility];  Service: Pain Management;  Laterality: Right;  L4, L5    kidney stones      RADIOFREQUENCY ABLATION OF LUMBAR MEDIAL BRANCH NERVE AT SINGLE LEVEL Right 10/23/2019    Procedure: Radiofrequency Ablation, Nerve, Spinal, Lumbar, Medial Branch, L4,5;  Surgeon: Vasquez Polk MD;  [facility]  Service: Pain Management;  Laterality: Right;  Burned at 80 degrees C X 60 seconds X 2 each site    RADIOFREQUENCY THERMOCOAGULATION Right 9/20/2019    Procedure: RADIOFREQUENCY THERMAL COAGULATION-CERVICAL;  Surgeon: Vasquez Polk MD;  [facility];  Service: Pain Management;  Laterality: Right;  CERVIAL C 3,4,5,6 -- Burned at 80 degrees C. for 60 seconds each site    TRANSFORAMINAL EPIDURAL INJECTION OF STEROID Right  "7/3/2019    Procedure: Injection,steroid,epidural,transforaminal approach C3-4;  Surgeon: Vasquez Polk MD;  Location: [facility];  Service: Pain Management;  Laterality: Right;     No family history on file.  Social History     Socioeconomic History    Marital status:      Spouse name: Not on file    Number of children: Not on file    Years of education: Not on file    Highest education level: Not on file   Occupational History    Not on file   Social Needs    Financial resource strain: Not on file    Food insecurity     Worry: Not on file     Inability: Not on file    Transportation needs     Medical: Not on file     Non-medical: Not on file   Tobacco Use    Smoking status: Never Smoker    Smokeless tobacco: Never Used   Substance and Sexual Activity    Alcohol use: Never     Frequency: Never    Drug use: Not on file    Sexual activity: Not on file   Lifestyle    Physical activity     Days per week: Not on file     Minutes per session: Not on file    Stress: Not on file   Relationships    Social connections     Talks on phone: Not on file     Gets together: Not on file     Attends Jainism service: Not on file     Active member of club or organization: Not on file     Attends meetings of clubs or organizations: Not on file     Relationship status: Not on file   Other Topics Concern    Not on file   Social History Narrative    Not on file         Medications/Allergies: See med card    Vitals:    10/22/20 1031   BP: 128/73   Pulse: 95   Weight: 53.1 kg (117 lb)   Height: 4' 11" (1.499 m)   PainSc: 10-Worst pain ever   PainLoc: Back         Physical exam:    GENERAL: A and O x3, the patient appears well groomed and is in no acute distress.  Skin: No rashes or obvious lesions  HEENT: normocephalic, atraumatic  CARDIOVASCULAR:  RRR  LUNGS: non labored breathing  ABDOMEN: soft, nontender   UPPER EXTREMITIES: Normal alignment, normal range of motion, no atrophy, no skin changes,  hair growth and nail " growth normal and equal bilaterally. No swelling, no tenderness.    LOWER EXTREMITIES:  Normal alignment, normal range of motion, no atrophy, no skin changes,  hair growth and nail growth normal and equal bilaterally. No swelling, no tenderness.  CERVICAL and LUMBAR SPINE:  Cervical spine: ROM is limited in flexion, extension and lateral rotation with moderate increased pain.  Lumbar spine: ROM is limited in flexion, extension and lateral rotation with moderate increased pain.  Spurling's maneuver causes neck pain to right side.  Myofascial exam: No Tenderness to palpation across cervical paraspinous region bilaterally.Severe TTP left lumbar paraspinous region. Muscle atrophy noted.     MENTAL STATUS: normal orientation, speech, language, and fund of knowledge for social situation.  Emotional state appropriate.    CRANIAL NERVES:  II:  PERRL bilaterally,   III,IV,VI: EOMI.    V:  Facial sensation equal bilaterally  VII:  Facial motor function normal.  VIII:  Hearing equal to finger rub bilaterally  IX/X: Gag normal, palate symmetric  XI:  Shoulder shrug equal, head turn equal  XII:  Tongue midline without fasciculations      MOTOR: Tone and bulk: normal bilateral upper and lower Strength: normal   Delt Bi Tri WE WF     R 5 5 5 5 5 5   L 5 5 5 5 5 5     IP ADD ABD Quad TA Gas HAM  R 5 5 5 5 5 5 5  L 5 5 5 5 5 5 5    SENSATION: Light touch and pinprick intact bilaterally  REFLEXES: normal, symmetric, nonbrisk.  Toes down, no clonus. No hoffmans.  GAIT: walker      Imaging:  MRI C-spine 5/2019  There are surgical changes related to previous instrumented multilevel anterior  fusion with plate and screw fixation extending from C4 to C7. The vertebral  bodies are appropriately maintained in height. There is mild retrolisthesis at  C3-C4 and minimal anterolisthesis at C7-T1. There is mild wedge-shaped  configuration of the T3 vertebral body with Schmorl's node defects observed  within both the superior and inferior  endplate. There is no associated marrow  edema.    At C2-C3, shallow disc bulging and osteophytic ridging results in a mild degree  of mass effect upon the thecal sac towards the right of midline without  impingement of the cervical cord. Facet degenerative changes contribute to  minor right foraminal narrowing.    At C3-C4, there is a large extrusion of the disc margin extending from the far  right lateral canal into the proximal right neural foramen. This is  superimposed on broad-based disc bulging and posterior osteophytic ridging.  Focal mass effect upon the right side of the thecal sac contacts and slightly  deforms the cervical cord towards the right of midline. Facet degenerative  changes contribute to severe right foraminal stenosis. There is associated mild  signal alteration within the substance of the cord at this level, likely  reflection of myelomalacia. There is a mild-moderate degree of left foraminal  narrowing.    At C4-C5, shallow disc bulging and osteophytic ridging results in mild mass  effect upon the ventral margin of the thecal sac with asymmetry towards the  right of midline. Facet degenerative changes contribute to moderately severe  degrees of foraminal encroachment.    At C5-C6, posterior marginal osteophyte formation extends from the far right  lateral canal into the right neural foramen contributing to asymmetric mass  effect upon the thecal sac without direct cord impingement. There is severe  right foraminal stenosis. Facet degenerative changes contribute to moderately  severe left foraminal narrowing.    At C6-C7, shallow disc bulging results in mild asymmetric mass effect upon the  thecal sac towards the right of midline. Facet degenerative changes contribute  to moderately severe right foraminal and moderate left foraminal narrowing.    At C7-T1, there is no significant disc bulging or focal soft disc herniation.  Facet degenerative changes which contributes to mild degrees of  foraminal  narrowing.    MRI lumbar spine 10/14/20  1. Chronic L3 inferior endplate compression fracture with 3 mm of  retropulsion.  2. Moderate to severe central canal narrowing at L3-L4.  3. 5 mm T2 hyperintense focus intimately associated with left L3-L4  facet joint, most consistent with a synovial cyst, resulting in  compression of left L4 nerve roots in the lateral recess. Correlation  for left L4 radiculopathy is requested.  4. Grade 1 anterolisthesis of L5 on S1 due to severe bilateral facet  joint osteoarthrosis.  5. Severe left and moderate to severe right L5-S1 neural foramen  narrowing. Correlation for bilateral L5 radiculopathies is requested.  6. Additional lumbar spine degenerative changes as described    Assessment:  Madhuri Manuel is a 75 y.o. female with   1. DDD (degenerative disc disease), lumbar    2. Lumbar radiculitis    3. Other spondylosis, lumbar region          Plan:  1. I have stressed the importance of physical activity and exercise to improve overall health  2. Reviewed pertinent imaging and records with patient  3. I think that the patient's back pain and radicular leg symptoms are due to degenerative disc disease and have recommended a lumbar epidural steroid injection to the Left  L3-4, L4-5 level(s).  4. Follow up after procedure

## 2020-10-26 ENCOUNTER — LAB VISIT (OUTPATIENT)
Dept: PRIMARY CARE CLINIC | Facility: CLINIC | Age: 75
End: 2020-10-26
Payer: MEDICARE

## 2020-10-26 DIAGNOSIS — Z01.818 PRE-OP TESTING: ICD-10-CM

## 2020-10-26 PROCEDURE — U0003 INFECTIOUS AGENT DETECTION BY NUCLEIC ACID (DNA OR RNA); SEVERE ACUTE RESPIRATORY SYNDROME CORONAVIRUS 2 (SARS-COV-2) (CORONAVIRUS DISEASE [COVID-19]), AMPLIFIED PROBE TECHNIQUE, MAKING USE OF HIGH THROUGHPUT TECHNOLOGIES AS DESCRIBED BY CMS-2020-01-R: HCPCS

## 2020-10-27 LAB — SARS-COV-2 RNA RESP QL NAA+PROBE: NOT DETECTED

## 2020-10-29 ENCOUNTER — HOSPITAL ENCOUNTER (OUTPATIENT)
Facility: AMBULARY SURGERY CENTER | Age: 75
Discharge: HOME | End: 2020-10-29
Attending: ANESTHESIOLOGY | Admitting: ANESTHESIOLOGY
Payer: MEDICARE

## 2020-10-29 VITALS
DIASTOLIC BLOOD PRESSURE: 61 MMHG | OXYGEN SATURATION: 98 % | TEMPERATURE: 98 F | RESPIRATION RATE: 18 BRPM | HEART RATE: 80 BPM | WEIGHT: 117.06 LBS | SYSTOLIC BLOOD PRESSURE: 125 MMHG | BODY MASS INDEX: 23.64 KG/M2

## 2020-10-29 DIAGNOSIS — M54.16 LUMBAR RADICULITIS: Primary | ICD-10-CM

## 2020-10-29 LAB — POCT GLUCOSE: 90 MG/DL (ref 70–110)

## 2020-10-29 PROCEDURE — 64484 PRA INJECT ANES/STEROID FORAMEN LUMBAR/SACRAL W IMG GUIDE ,EA ADD LEVEL: ICD-10-PCS | Mod: LT,,, | Performed by: ANESTHESIOLOGY

## 2020-10-29 PROCEDURE — 64483 NJX AA&/STRD TFRM EPI L/S 1: CPT | Mod: LT,,, | Performed by: ANESTHESIOLOGY

## 2020-10-29 PROCEDURE — 64483 PR EPIDURAL INJ, ANES/STEROID, TRANSFORAMINAL, LUMB/SACR, SNGL LEVL: ICD-10-PCS | Mod: LT,,, | Performed by: ANESTHESIOLOGY

## 2020-10-29 PROCEDURE — 64484 NJX AA&/STRD TFRM EPI L/S EA: CPT | Performed by: ANESTHESIOLOGY

## 2020-10-29 PROCEDURE — 64484 NJX AA&/STRD TFRM EPI L/S EA: CPT | Mod: LT,,, | Performed by: ANESTHESIOLOGY

## 2020-10-29 PROCEDURE — 64483 NJX AA&/STRD TFRM EPI L/S 1: CPT | Performed by: ANESTHESIOLOGY

## 2020-10-29 RX ORDER — LIDOCAINE HYDROCHLORIDE 10 MG/ML
INJECTION, SOLUTION EPIDURAL; INFILTRATION; INTRACAUDAL; PERINEURAL
Status: DISCONTINUED | OUTPATIENT
Start: 2020-10-29 | End: 2020-10-29 | Stop reason: HOSPADM

## 2020-10-29 RX ORDER — BUPIVACAINE HYDROCHLORIDE 2.5 MG/ML
INJECTION, SOLUTION EPIDURAL; INFILTRATION; INTRACAUDAL
Status: DISCONTINUED | OUTPATIENT
Start: 2020-10-29 | End: 2020-10-29 | Stop reason: HOSPADM

## 2020-10-29 RX ORDER — SODIUM CHLORIDE, SODIUM LACTATE, POTASSIUM CHLORIDE, CALCIUM CHLORIDE 600; 310; 30; 20 MG/100ML; MG/100ML; MG/100ML; MG/100ML
INJECTION, SOLUTION INTRAVENOUS ONCE AS NEEDED
Status: COMPLETED | OUTPATIENT
Start: 2020-10-29 | End: 2020-10-29

## 2020-10-29 RX ORDER — MIDAZOLAM HYDROCHLORIDE 1 MG/ML
INJECTION INTRAMUSCULAR; INTRAVENOUS
Status: DISCONTINUED | OUTPATIENT
Start: 2020-10-29 | End: 2020-10-29 | Stop reason: HOSPADM

## 2020-10-29 RX ORDER — DEXAMETHASONE SODIUM PHOSPHATE 10 MG/ML
INJECTION INTRAMUSCULAR; INTRAVENOUS
Status: DISCONTINUED | OUTPATIENT
Start: 2020-10-29 | End: 2020-10-29 | Stop reason: HOSPADM

## 2020-10-29 RX ORDER — FENTANYL CITRATE 50 UG/ML
INJECTION, SOLUTION INTRAMUSCULAR; INTRAVENOUS
Status: DISCONTINUED | OUTPATIENT
Start: 2020-10-29 | End: 2020-10-29 | Stop reason: HOSPADM

## 2020-10-29 RX ADMIN — SODIUM CHLORIDE, SODIUM LACTATE, POTASSIUM CHLORIDE, CALCIUM CHLORIDE: 600; 310; 30; 20 INJECTION, SOLUTION INTRAVENOUS at 12:10

## 2020-10-29 NOTE — PLAN OF CARE
Patient awake, alert and says she is ready to go home. Patient denies pain, nausea weakness or dizziness. Vital signs and injection sites stable. All pt belongings have been returned to patient. Her cell phone is in her rolling walker seat. Patient's granddaughter Jocelyn is present and states she is ready to take pt home and she is driving the patient home.

## 2020-10-29 NOTE — OP NOTE
PROCEDURE DATE: 10/29/2020    PROCEDURE: Left L3-4, L4-5 transforaminal epidural steroid injection under fluoroscopy    DIAGNOSIS: Lumbar disc displacement without myelopathy  Post op diagnosis: Same    PHYSICIAN: Vasquez Plok MD    MEDICATIONS INJECTED:  Dexamethasone 5mg (0.5ml) and 1.5ml 0.25% bupivicaine at each nerve root.     LOCAL ANESTHETIC INJECTED:  Lidocaine 1%. 2 ml per site.    SEDATION MEDICATIONS: RN IV sedation    ESTIMATED BLOOD LOSS:  None    COMPLICATIONS:  None    TECHNIQUE:   A time-out was taken to identify patient and procedure side prior to starting the procedure. The patient was placed in a prone position, prepped and draped in the usual sterile fashion using ChloraPrep and sterile towels.  The area to be injected was determined under fluoroscopic guidance in AP and oblique view.  Local anesthetic was given by raising a wheal and going down to the hub of a 25-gauge 1.5 inch needle.  In oblique view, a 3.5 inch 22-gauge bent-tip spinal needle was introduced towards 6 oclock position of the pedicle of each above named nerve root level.  The needle was walked medially then hinged into the neural foramen and position was confirmed in AP and lateral views.  1ml of non-iodine contrast dye was injected to confirm appropriate placement and that there was no vascular uptake.  After negative aspiration for blood or CSF, the medication was then injected. This was performed at the left L3-4, L4-5 level(s). The patient tolerated the procedure well.    The patient was monitored after the procedure.  Patient was given post procedure and discharge instructions to follow at home. The patient was discharged in a stable condition.

## 2020-10-29 NOTE — INTERVAL H&P NOTE
The patient has been examined and the H&P has been reviewed:    I concur with the findings and no changes have occurred since H&P was written.  This patient has been cleared for surgery in an ambulatory surgical facility    ASA 3,  Mallampatti Score 3  No history of anesthetic complications  Plan for RN IV sedation    Surgery risks, benefits and alternative options discussed and understood by patient/family.          Active Hospital Problems    Diagnosis  POA    Lumbar radiculitis [M54.16]  Yes      Resolved Hospital Problems   No resolved problems to display.

## 2020-10-29 NOTE — DISCHARGE SUMMARY
[facility]  Discharge Note  Short Stay    Procedure(s) (LRB):  Injection,steroid,epidural,transforaminal approach (Left)    OUTCOME: Patient tolerated treatment/procedure well without complication and is now ready for discharge.    DISPOSITION: Home or Self Care    FINAL DIAGNOSIS:  Lumbar radiculitis    FOLLOWUP: In clinic    DISCHARGE INSTRUCTIONS:    Discharge Procedure Orders   Call MD for:  temperature >100.4     Call MD for:  persistent nausea and vomiting or diarrhea     Call MD for:  severe uncontrolled pain     Call MD for:  redness, tenderness, or signs of infection (pain, swelling, redness, odor or green/yellow discharge around incision site)     Call MD for:  difficulty breathing or increased cough     Call MD for:  severe persistent headache

## 2020-11-16 ENCOUNTER — TELEPHONE (OUTPATIENT)
Dept: PAIN MEDICINE | Facility: CLINIC | Age: 75
End: 2020-11-16

## 2020-11-16 RX ORDER — HYDROCODONE BITARTRATE AND ACETAMINOPHEN 5; 325 MG/1; MG/1
1 TABLET ORAL EVERY 6 HOURS PRN
Qty: 28 TABLET | Refills: 0 | Status: SHIPPED | OUTPATIENT
Start: 2020-11-16 | End: 2020-12-16

## 2020-11-16 NOTE — TELEPHONE ENCOUNTER
Pt stated she is in a lot of pain and numbness since her procedure 10/29/20. Rescheduled her f/u to this week. Pt is asking for something for the pain , OTC meds not touching the pain.

## 2020-11-16 NOTE — TELEPHONE ENCOUNTER
----- Message from Alex Garcia sent at 11/16/2020  9:00 AM CST -----  Type: Needs Medical Advice  Who Called:  Patient  Symptoms (please be specific):  Pain in lower left back--left leg is numb  How long has patient had these symptoms:  Ever since procedure on 10/29/20    Pharmacy name and phone #:    [facility] - [place], [place] - [address] [facility] [address]  Phone: [phone] Fax: [phone]      Best Call Back Number: [phone]  Additional Information: Please call to advise

## 2020-11-19 ENCOUNTER — OFFICE VISIT (OUTPATIENT)
Dept: PAIN MEDICINE | Facility: CLINIC | Age: 75
End: 2020-11-19
Payer: MEDICARE

## 2020-11-19 VITALS
HEART RATE: 85 BPM | SYSTOLIC BLOOD PRESSURE: 141 MMHG | DIASTOLIC BLOOD PRESSURE: 63 MMHG | HEIGHT: 59 IN | BODY MASS INDEX: 23.59 KG/M2 | WEIGHT: 117 LBS

## 2020-11-19 DIAGNOSIS — Z01.818 PRE-OP TESTING: ICD-10-CM

## 2020-11-19 DIAGNOSIS — M47.896 OTHER SPONDYLOSIS, LUMBAR REGION: Primary | ICD-10-CM

## 2020-11-19 DIAGNOSIS — M79.18 MYOFASCIAL PAIN: ICD-10-CM

## 2020-11-19 DIAGNOSIS — M51.36 DDD (DEGENERATIVE DISC DISEASE), LUMBAR: ICD-10-CM

## 2020-11-19 PROCEDURE — 99999 PR PBB SHADOW E&M-EST. PATIENT-LVL IV: ICD-10-PCS | Mod: PBBFAC,,, | Performed by: PHYSICIAN ASSISTANT

## 2020-11-19 PROCEDURE — 99213 OFFICE O/P EST LOW 20 MIN: CPT | Mod: S$PBB,,, | Performed by: PHYSICIAN ASSISTANT

## 2020-11-19 PROCEDURE — 99999 PR PBB SHADOW E&M-EST. PATIENT-LVL IV: CPT | Mod: PBBFAC,,, | Performed by: PHYSICIAN ASSISTANT

## 2020-11-19 PROCEDURE — 99214 OFFICE O/P EST MOD 30 MIN: CPT | Mod: PBBFAC,PN | Performed by: PHYSICIAN ASSISTANT

## 2020-11-19 PROCEDURE — 99213 PR OFFICE/OUTPT VISIT, EST, LEVL III, 20-29 MIN: ICD-10-PCS | Mod: S$PBB,,, | Performed by: PHYSICIAN ASSISTANT

## 2020-11-19 RX ORDER — LANCETS 28 GAUGE
EACH MISCELLANEOUS
COMMUNITY
Start: 2020-11-17

## 2020-11-19 RX ORDER — ISOSORBIDE MONONITRATE 30 MG/1
30 TABLET, EXTENDED RELEASE ORAL DAILY
COMMUNITY
Start: 2020-11-11

## 2020-11-19 RX ORDER — LIDOCAINE 50 MG/G
PATCH TOPICAL
COMMUNITY
Start: 2020-10-07 | End: 2021-03-10 | Stop reason: SDUPTHER

## 2020-11-19 RX ORDER — VIT C/E/ZN/COPPR/LUTEIN/ZEAXAN 250MG-90MG
CAPSULE ORAL
COMMUNITY
Start: 2020-11-17

## 2020-11-19 NOTE — H&P (VIEW-ONLY)
Referring Physician: No ref. provider found    PCP: [id number]      CC:  Low back and leg pain    Interval history: Madhuri Manuel is a 75 y.o. female with both low back and neck pain who presents today for f/u s/p Left TF PATRICK at L3-4 and L5-S1. Reports no improvement. Continues to c/o pain in midline low back, on left in low back, and down left anterior thigh.   Pain has been constant and worse with positional changes. Denies any radiation into LE, b/b changes, or worsening weakness.She had a IM injection that did not provide any benefit. In the past she had cervical and lumbar MB RFA procedures which were helpful. This pain is different.  She was recently seen by spine surgery in updated lumbar MRI was performed.    Prior HPI:   Madhuri Manuel is a 75 y.o. female referred to us for right-sided neck pain. Pain has been present for 5 years.  She has history of cervical fusion in 2017.  She presents to us with constant burning, throbbing pain in her neck.  Pain radiates to her right shoulder.  Pain did radiate down her right arm.  She underwent a rig8/10.ht-sided changes.  cervical transforaminal epidural steroid injection on July 3, 2019 which help with her radiating right arm pain.  Neck and right shoulder pain still remains in his bothersome.  Pain worsens with lateral rotation, extension lifting.  Pain improves with rest.  She is referred back for spine surgery for consideration of cervical medial branch blocks.  She denies any worsening weakness.  No bowel bladder changes.    Interventional history:  Cervical PATRICK 07/2019   Right C3-C6 MB RFA 09/2019 with 50% relief.  Right L3-L5 MB RFA procedure with 50% relief  10/29/20 left TF PATRICK on left at L3-4 and L5-S1 no relief    ROS:  CONSTITUTIONAL: No fevers, chills, night sweats, wt. loss, appetite changes  SKIN: no rashes or itching  ENT: No headaches, head trauma, vision changes, or eye pain  LYMPH NODES: None noticed   CV: No chest pain,  palpitations.   RESP: No shortness of breath, dyspnea on exertion, cough, wheezing, or hemoptysis  GI: No nausea, emesis, diarrhea, constipation, melena, hematochezia, pain.    : No dysuria, hematuria, urgency, or frequency   HEME: No easy bruising, bleeding problems  PSYCHIATRIC: No depression, anxiety, psychosis, hallucinations.  NEURO: No seizures, memory loss, dizziness or difficulty sleeping  MSK:  Positive HPI      Past Medical History:   Diagnosis Date    Anemia     Diabetes mellitus, type 2     Fracture of left foot     Hypertension     Neck pain     Osteoporosis      Past Surgical History:   Procedure Laterality Date    CERVICAL FUSION      gastric by pass      INJECTION OF ANESTHETIC AGENT AROUND MEDIAL BRANCH NERVES INNERVATING CERVICAL FACET JOINT Right 8/30/2019    Procedure: Block-nerve-medial branch-cervical;  Surgeon: Vasquez Polk MD;  Location: [facility];  Service: Pain Management;  Laterality: Right;  C3,4,5,6    INJECTION OF ANESTHETIC AGENT AROUND MEDIAL BRANCH NERVES INNERVATING LUMBAR FACET JOINT Right 10/3/2019    Procedure: Block-nerve-medial branch-lumbar;  Surgeon: Vasquez Polk MD;  Location: [facility];  Service: Pain Management;  Laterality: Right;  L4, L5    kidney stones      RADIOFREQUENCY ABLATION OF LUMBAR MEDIAL BRANCH NERVE AT SINGLE LEVEL Right 10/23/2019    Procedure: Radiofrequency Ablation, Nerve, Spinal, Lumbar, Medial Branch, L4,5;  Surgeon: Vasquez Polk MD;  Location: [facility];  Service: Pain Management;  Laterality: Right;  Burned at 80 degrees C X 60 seconds X 2 each site    RADIOFREQUENCY THERMOCOAGULATION Right 9/20/2019    Procedure: RADIOFREQUENCY THERMAL COAGULATION-CERVICAL;  Surgeon: Vasquez Polk MD;  Location: [facility] OR;  Service: Pain Management;  Laterality: Right;  CERVIAL C 3,4,5,6 -- Burned at 80 degrees C. for 60 seconds each site    TRANSFORAMINAL EPIDURAL INJECTION OF STEROID Right 7/3/2019    Procedure: Injection,steroid,epidural,transforaminal  "approach C3-4;  Surgeon: Vasquez Polk MD;  Location: [facility] OR;  Service: Pain Management;  Laterality: Right;    TRANSFORAMINAL EPIDURAL INJECTION OF STEROID Left 10/29/2020    Procedure: Injection,steroid,epidural,transforaminal approach;  Surgeon: Vasquez Polk MD;  Location: [facility] OR;  Service: Pain Management;  Laterality: Left;  L3-4, L5-S1     History reviewed. No pertinent family history.  Social History     Socioeconomic History    Marital status:      Spouse name: Not on file    Number of children: Not on file    Years of education: Not on file    Highest education level: Not on file   Occupational History    Not on file   Social Needs    Financial resource strain: Not on file    Food insecurity     Worry: Not on file     Inability: Not on file    Transportation needs     Medical: Not on file     Non-medical: Not on file   Tobacco Use    Smoking status: Never Smoker    Smokeless tobacco: Never Used   Substance and Sexual Activity    Alcohol use: Never     Frequency: Never    Drug use: Not on file    Sexual activity: Not on file   Lifestyle    Physical activity     Days per week: Not on file     Minutes per session: Not on file    Stress: Not on file   Relationships    Social connections     Talks on phone: Not on file     Gets together: Not on file     Attends Confucianism service: Not on file     Active member of club or organization: Not on file     Attends meetings of clubs or organizations: Not on file     Relationship status: Not on file   Other Topics Concern    Not on file   Social History Narrative    Not on file         Medications/Allergies: See med card    Vitals:    11/19/20 1100   BP: (!) 141/63   Pulse: 85   Weight: 53.1 kg (117 lb)   Height: 4' 11" (1.499 m)   PainSc:   8   PainLoc: Back         Physical exam:    GENERAL: A and O x3, the patient appears well groomed and is in no acute distress.  Skin: No rashes or obvious lesions  HEENT: normocephalic, " atraumatic  CARDIOVASCULAR:  RRR  LUNGS: non labored breathing  ABDOMEN: soft, nontender   UPPER EXTREMITIES: Normal alignment, normal range of motion, no atrophy, no skin changes,  hair growth and nail growth normal and equal bilaterally. No swelling, no tenderness.    LOWER EXTREMITIES:  Normal alignment, normal range of motion, no atrophy, no skin changes,  hair growth and nail growth normal and equal bilaterally. No swelling, no tenderness.  CERVICAL and LUMBAR SPINE:  Cervical spine: ROM is limited in flexion, extension and lateral rotation with moderate increased pain.  Lumbar spine: ROM is limited in flexion, extension and lateral rotation with moderate increased pain.  Spurling's maneuver causes neck pain to right side.  Myofascial exam: No Tenderness to palpation across cervical paraspinous region bilaterally.Severe TTP left lumbar paraspinous region. Muscle atrophy noted.     MENTAL STATUS: normal orientation, speech, language, and fund of knowledge for social situation.  Emotional state appropriate.    CRANIAL NERVES:  II:  PERRL bilaterally,   III,IV,VI: EOMI.    V:  Facial sensation equal bilaterally  VII:  Facial motor function normal.  VIII:  Hearing equal to finger rub bilaterally  IX/X: Gag normal, palate symmetric  XI:  Shoulder shrug equal, head turn equal  XII:  Tongue midline without fasciculations      MOTOR: Tone and bulk: normal bilateral upper and lower Strength: normal   Delt Bi Tri WE WF     R 5 5 5 5 5 5   L 5 5 5 5 5 5     IP ADD ABD Quad TA Gas HAM  R 5 5 5 5 5 5 5  L 5 5 5 5 5 5 5    SENSATION: Light touch and pinprick intact bilaterally  REFLEXES: normal, symmetric, nonbrisk.  Toes down, no clonus. No hoffmans.  GAIT: walker      Imaging:  MRI C-spine 5/2019  There are surgical changes related to previous instrumented multilevel anterior  fusion with plate and screw fixation extending from C4 to C7. The vertebral  bodies are appropriately maintained in height. There is mild  retrolisthesis at  C3-C4 and minimal anterolisthesis at C7-T1. There is mild wedge-shaped  configuration of the T3 vertebral body with Schmorl's node defects observed  within both the superior and inferior endplate. There is no associated marrow  edema.    At C2-C3, shallow disc bulging and osteophytic ridging results in a mild degree  of mass effect upon the thecal sac towards the right of midline without  impingement of the cervical cord. Facet degenerative changes contribute to  minor right foraminal narrowing.    At C3-C4, there is a large extrusion of the disc margin extending from the far  right lateral canal into the proximal right neural foramen. This is  superimposed on broad-based disc bulging and posterior osteophytic ridging.  Focal mass effect upon the right side of the thecal sac contacts and slightly  deforms the cervical cord towards the right of midline. Facet degenerative  changes contribute to severe right foraminal stenosis. There is associated mild  signal alteration within the substance of the cord at this level, likely  reflection of myelomalacia. There is a mild-moderate degree of left foraminal  narrowing.    At C4-C5, shallow disc bulging and osteophytic ridging results in mild mass  effect upon the ventral margin of the thecal sac with asymmetry towards the  right of midline. Facet degenerative changes contribute to moderately severe  degrees of foraminal encroachment.    At C5-C6, posterior marginal osteophyte formation extends from the far right  lateral canal into the right neural foramen contributing to asymmetric mass  effect upon the thecal sac without direct cord impingement. There is severe  right foraminal stenosis. Facet degenerative changes contribute to moderately  severe left foraminal narrowing.    At C6-C7, shallow disc bulging results in mild asymmetric mass effect upon the  thecal sac towards the right of midline. Facet degenerative changes contribute  to moderately severe  right foraminal and moderate left foraminal narrowing.    At C7-T1, there is no significant disc bulging or focal soft disc herniation.  Facet degenerative changes which contributes to mild degrees of foraminal  narrowing.    MRI lumbar spine 10/14/20  1. Chronic L3 inferior endplate compression fracture with 3 mm of  retropulsion.  2. Moderate to severe central canal narrowing at L3-L4.  3. 5 mm T2 hyperintense focus intimately associated with left L3-L4  facet joint, most consistent with a synovial cyst, resulting in  compression of left L4 nerve roots in the lateral recess. Correlation  for left L4 radiculopathy is requested.  4. Grade 1 anterolisthesis of L5 on S1 due to severe bilateral facet  joint osteoarthrosis.  5. Severe left and moderate to severe right L5-S1 neural foramen  narrowing. Correlation for bilateral L5 radiculopathies is requested.  6. Additional lumbar spine degenerative changes as described    Assessment:  Madhuri Manuel is a 75 y.o. female with   1. Other spondylosis, lumbar region    2. DDD (degenerative disc disease), lumbar    3. Myofascial pain          Plan:  1. I have stressed the importance of physical activity and exercise to improve overall health  2. Reviewed pertinent imaging and records with patient  3. Schedule Left MBB at L3, 4, 5. I have explained the risks, benefits, and alternatives of the procedure in detail. The patient voices understanding and all questions have been answered. The patient agrees to proceed as planned. Written Consent obtained.   4. If above is positive, will continue with RFA. If negative she will f/u with neurosurgery

## 2020-11-19 NOTE — PROGRESS NOTES
Referring Physician: No ref. provider found    PCP: [id number]      CC:  Low back and leg pain    Interval history: Madhuri Manuel is a 75 y.o. female with both low back and neck pain who presents today for f/u s/p Left TF PATRICK at L3-4 and L5-S1. Reports no improvement. Continues to c/o pain in midline low back, on left in low back, and down left anterior thigh.   Pain has been constant and worse with positional changes. Denies any radiation into LE, b/b changes, or worsening weakness.She had a IM injection that did not provide any benefit. In the past she had cervical and lumbar MB RFA procedures which were helpful. This pain is different.  She was recently seen by spine surgery in updated lumbar MRI was performed.    Prior HPI:   Madhuri Manuel is a 75 y.o. female referred to us for right-sided neck pain. Pain has been present for 5 years.  She has history of cervical fusion in 2017.  She presents to us with constant burning, throbbing pain in her neck.  Pain radiates to her right shoulder.  Pain did radiate down her right arm.  She underwent a rig8/10.ht-sided changes.  cervical transforaminal epidural steroid injection on July 3, 2019 which help with her radiating right arm pain.  Neck and right shoulder pain still remains in his bothersome.  Pain worsens with lateral rotation, extension lifting.  Pain improves with rest.  She is referred back for spine surgery for consideration of cervical medial branch blocks.  She denies any worsening weakness.  No bowel bladder changes.    Interventional history:  Cervical PATRICK 07/2019   Right C3-C6 MB RFA 09/2019 with 50% relief.  Right L3-L5 MB RFA procedure with 50% relief  10/29/20 left TF PATRICK on left at L3-4 and L5-S1 no relief    ROS:  CONSTITUTIONAL: No fevers, chills, night sweats, wt. loss, appetite changes  SKIN: no rashes or itching  ENT: No headaches, head trauma, vision changes, or eye pain  LYMPH NODES: None noticed   CV: No chest pain,  palpitations.   RESP: No shortness of breath, dyspnea on exertion, cough, wheezing, or hemoptysis  GI: No nausea, emesis, diarrhea, constipation, melena, hematochezia, pain.    : No dysuria, hematuria, urgency, or frequency   HEME: No easy bruising, bleeding problems  PSYCHIATRIC: No depression, anxiety, psychosis, hallucinations.  NEURO: No seizures, memory loss, dizziness or difficulty sleeping  MSK:  Positive HPI      Past Medical History:   Diagnosis Date    Anemia     Diabetes mellitus, type 2     Fracture of left foot     Hypertension     Neck pain     Osteoporosis      Past Surgical History:   Procedure Laterality Date    CERVICAL FUSION      gastric by pass      INJECTION OF ANESTHETIC AGENT AROUND MEDIAL BRANCH NERVES INNERVATING CERVICAL FACET JOINT Right 8/30/2019    Procedure: Block-nerve-medial branch-cervical;  Surgeon: Vasquez Polk MD;  Location: [facility];  Service: Pain Management;  Laterality: Right;  C3,4,5,6    INJECTION OF ANESTHETIC AGENT AROUND MEDIAL BRANCH NERVES INNERVATING LUMBAR FACET JOINT Right 10/3/2019    Procedure: Block-nerve-medial branch-lumbar;  Surgeon: Vasquez Polk MD;  Location: [facility];  Service: Pain Management;  Laterality: Right;  L4, L5    kidney stones      RADIOFREQUENCY ABLATION OF LUMBAR MEDIAL BRANCH NERVE AT SINGLE LEVEL Right 10/23/2019    Procedure: Radiofrequency Ablation, Nerve, Spinal, Lumbar, Medial Branch, L4,5;  Surgeon: Vasquez Polk MD;  Location: [facility] OR;  Service: Pain Management;  Laterality: Right;  Burned at 80 degrees C X 60 seconds X 2 each site    RADIOFREQUENCY THERMOCOAGULATION Right 9/20/2019    Procedure: RADIOFREQUENCY THERMAL COAGULATION-CERVICAL;  Surgeon: Vasquez Polk MD;  Location: [facility] OR;  Service: Pain Management;  Laterality: Right;  CERVIAL C 3,4,5,6 -- Burned at 80 degrees C. for 60 seconds each site    TRANSFORAMINAL EPIDURAL INJECTION OF STEROID Right 7/3/2019    Procedure: Injection,steroid,epidural,transforaminal  "approach C3-4;  Surgeon: Vasquez Polk MD;  Location: [facility] OR;  Service: Pain Management;  Laterality: Right;    TRANSFORAMINAL EPIDURAL INJECTION OF STEROID Left 10/29/2020    Procedure: Injection,steroid,epidural,transforaminal approach;  Surgeon: Vasquez Polk MD;  Location: [facility] OR;  Service: Pain Management;  Laterality: Left;  L3-4, L5-S1     History reviewed. No pertinent family history.  Social History     Socioeconomic History    Marital status:      Spouse name: Not on file    Number of children: Not on file    Years of education: Not on file    Highest education level: Not on file   Occupational History    Not on file   Social Needs    Financial resource strain: Not on file    Food insecurity     Worry: Not on file     Inability: Not on file    Transportation needs     Medical: Not on file     Non-medical: Not on file   Tobacco Use    Smoking status: Never Smoker    Smokeless tobacco: Never Used   Substance and Sexual Activity    Alcohol use: Never     Frequency: Never    Drug use: Not on file    Sexual activity: Not on file   Lifestyle    Physical activity     Days per week: Not on file     Minutes per session: Not on file    Stress: Not on file   Relationships    Social connections     Talks on phone: Not on file     Gets together: Not on file     Attends [employer] service: Not on file     Active member of club or organization: Not on file     Attends meetings of clubs or organizations: Not on file     Relationship status: Not on file   Other Topics Concern    Not on file   Social History Narrative    Not on file         Medications/Allergies: See med card    Vitals:    11/19/20 1100   BP: (!) 141/63   Pulse: 85   Weight: 53.1 kg (117 lb)   Height: 4' 11" (1.499 m)   PainSc:   8   PainLoc: Back         Physical exam:    GENERAL: A and O x3, the patient appears well groomed and is in no acute distress.  Skin: No rashes or obvious lesions  HEENT: normocephalic, " atraumatic  CARDIOVASCULAR:  RRR  LUNGS: non labored breathing  ABDOMEN: soft, nontender   UPPER EXTREMITIES: Normal alignment, normal range of motion, no atrophy, no skin changes,  hair growth and nail growth normal and equal bilaterally. No swelling, no tenderness.    LOWER EXTREMITIES:  Normal alignment, normal range of motion, no atrophy, no skin changes,  hair growth and nail growth normal and equal bilaterally. No swelling, no tenderness.  CERVICAL and LUMBAR SPINE:  Cervical spine: ROM is limited in flexion, extension and lateral rotation with moderate increased pain.  Lumbar spine: ROM is limited in flexion, extension and lateral rotation with moderate increased pain.  Spurling's maneuver causes neck pain to right side.  Myofascial exam: No Tenderness to palpation across cervical paraspinous region bilaterally.Severe TTP left lumbar paraspinous region. Muscle atrophy noted.     MENTAL STATUS: normal orientation, speech, language, and fund of knowledge for social situation.  Emotional state appropriate.    CRANIAL NERVES:  II:  PERRL bilaterally,   III,IV,VI: EOMI.    V:  Facial sensation equal bilaterally  VII:  Facial motor function normal.  VIII:  Hearing equal to finger rub bilaterally  IX/X: Gag normal, palate symmetric  XI:  Shoulder shrug equal, head turn equal  XII:  Tongue midline without fasciculations      MOTOR: Tone and bulk: normal bilateral upper and lower Strength: normal   Delt Bi Tri WE WF     R 5 5 5 5 5 5   L 5 5 5 5 5 5     IP ADD ABD Quad TA Gas HAM  R 5 5 5 5 5 5 5  L 5 5 5 5 5 5 5    SENSATION: Light touch and pinprick intact bilaterally  REFLEXES: normal, symmetric, nonbrisk.  Toes down, no clonus. No hoffmans.  GAIT: walker      Imaging:  MRI C-spine 5/2019  There are surgical changes related to previous instrumented multilevel anterior  fusion with plate and screw fixation extending from C4 to C7. The vertebral  bodies are appropriately maintained in height. There is mild  retrolisthesis at  C3-C4 and minimal anterolisthesis at C7-T1. There is mild wedge-shaped  configuration of the T3 vertebral body with Schmorl's node defects observed  within both the superior and inferior endplate. There is no associated marrow  edema.    At C2-C3, shallow disc bulging and osteophytic ridging results in a mild degree  of mass effect upon the thecal sac towards the right of midline without  impingement of the cervical cord. Facet degenerative changes contribute to  minor right foraminal narrowing.    At C3-C4, there is a large extrusion of the disc margin extending from the far  right lateral canal into the proximal right neural foramen. This is  superimposed on broad-based disc bulging and posterior osteophytic ridging.  Focal mass effect upon the right side of the thecal sac contacts and slightly  deforms the cervical cord towards the right of midline. Facet degenerative  changes contribute to severe right foraminal stenosis. There is associated mild  signal alteration within the substance of the cord at this level, likely  reflection of myelomalacia. There is a mild-moderate degree of left foraminal  narrowing.    At C4-C5, shallow disc bulging and osteophytic ridging results in mild mass  effect upon the ventral margin of the thecal sac with asymmetry towards the  right of midline. Facet degenerative changes contribute to moderately severe  degrees of foraminal encroachment.    At C5-C6, posterior marginal osteophyte formation extends from the far right  lateral canal into the right neural foramen contributing to asymmetric mass  effect upon the thecal sac without direct cord impingement. There is severe  right foraminal stenosis. Facet degenerative changes contribute to moderately  severe left foraminal narrowing.    At C6-C7, shallow disc bulging results in mild asymmetric mass effect upon the  thecal sac towards the right of midline. Facet degenerative changes contribute  to moderately severe  right foraminal and moderate left foraminal narrowing.    At C7-T1, there is no significant disc bulging or focal soft disc herniation.  Facet degenerative changes which contributes to mild degrees of foraminal  narrowing.    MRI lumbar spine 10/14/20  1. Chronic L3 inferior endplate compression fracture with 3 mm of  retropulsion.  2. Moderate to severe central canal narrowing at L3-L4.  3. 5 mm T2 hyperintense focus intimately associated with left L3-L4  facet joint, most consistent with a synovial cyst, resulting in  compression of left L4 nerve roots in the lateral recess. Correlation  for left L4 radiculopathy is requested.  4. Grade 1 anterolisthesis of L5 on S1 due to severe bilateral facet  joint osteoarthrosis.  5. Severe left and moderate to severe right L5-S1 neural foramen  narrowing. Correlation for bilateral L5 radiculopathies is requested.  6. Additional lumbar spine degenerative changes as described    Assessment:  Madhuri Manuel is a 75 y.o. female with   1. Other spondylosis, lumbar region    2. DDD (degenerative disc disease), lumbar    3. Myofascial pain          Plan:  1. I have stressed the importance of physical activity and exercise to improve overall health  2. Reviewed pertinent imaging and records with patient  3. Schedule Left MBB at L3, 4, 5. I have explained the risks, benefits, and alternatives of the procedure in detail. The patient voices understanding and all questions have been answered. The patient agrees to proceed as planned. Written Consent obtained.   4. If above is positive, will continue with RFA. If negative she will f/u with neurosurgery

## 2020-11-19 NOTE — H&P (VIEW-ONLY)
Referring Physician: No ref. provider found    PCP: [id number]      CC:  Low back and leg pain    Interval history: Madhuri Manuel is a 75 y.o. female with both low back and neck pain who presents today for f/u s/p Left TF PATRICK at L3-4 and L5-S1. Reports no improvement. Continues to c/o pain in midline low back, on left in low back, and down left anterior thigh.   Pain has been constant and worse with positional changes. Denies any radiation into LE, b/b changes, or worsening weakness.She had a IM injection that did not provide any benefit. In the past she had cervical and lumbar MB RFA procedures which were helpful. This pain is different.  She was recently seen by spine surgery in updated lumbar MRI was performed.    Prior HPI:   Madhuri Manuel is a 75 y.o. female referred to us for right-sided neck pain. Pain has been present for 5 years.  She has history of cervical fusion in 2017.  She presents to us with constant burning, throbbing pain in her neck.  Pain radiates to her right shoulder.  Pain did radiate down her right arm.  She underwent a rig8/10.ht-sided changes.  cervical transforaminal epidural steroid injection on July 3, 2019 which help with her radiating right arm pain.  Neck and right shoulder pain still remains in his bothersome.  Pain worsens with lateral rotation, extension lifting.  Pain improves with rest.  She is referred back for spine surgery for consideration of cervical medial branch blocks.  She denies any worsening weakness.  No bowel bladder changes.    Interventional history:  Cervical PATRICK 07/2019   Right C3-C6 MB RFA 09/2019 with 50% relief.  Right L3-L5 MB RFA procedure with 50% relief  10/29/20 left TF PATRICK on left at L3-4 and L5-S1 no relief    ROS:  CONSTITUTIONAL: No fevers, chills, night sweats, wt. loss, appetite changes  SKIN: no rashes or itching  ENT: No headaches, head trauma, vision changes, or eye pain  LYMPH NODES: None noticed   CV: No chest pain,  palpitations.   RESP: No shortness of breath, dyspnea on exertion, cough, wheezing, or hemoptysis  GI: No nausea, emesis, diarrhea, constipation, melena, hematochezia, pain.    : No dysuria, hematuria, urgency, or frequency   HEME: No easy bruising, bleeding problems  PSYCHIATRIC: No depression, anxiety, psychosis, hallucinations.  NEURO: No seizures, memory loss, dizziness or difficulty sleeping  MSK:  Positive HPI      Past Medical History:   Diagnosis Date    Anemia     Diabetes mellitus, type 2     Fracture of left foot     Hypertension     Neck pain     Osteoporosis      Past Surgical History:   Procedure Laterality Date    CERVICAL FUSION      gastric by pass      INJECTION OF ANESTHETIC AGENT AROUND MEDIAL BRANCH NERVES INNERVATING CERVICAL FACET JOINT Right 8/30/2019    Procedure: Block-nerve-medial branch-cervical;  Surgeon: Vasquez Polk MD;  Location: [facility];  Service: Pain Management;  Laterality: Right;  C3,4,5,6    INJECTION OF ANESTHETIC AGENT AROUND MEDIAL BRANCH NERVES INNERVATING LUMBAR FACET JOINT Right 10/3/2019    Procedure: Block-nerve-medial branch-lumbar;  Surgeon: Vasquez Polk MD;  Location: [facility];  Service: Pain Management;  Laterality: Right;  L4, L5    kidney stones      RADIOFREQUENCY ABLATION OF LUMBAR MEDIAL BRANCH NERVE AT SINGLE LEVEL Right 10/23/2019    Procedure: Radiofrequency Ablation, Nerve, Spinal, Lumbar, Medial Branch, L4,5;  Surgeon: Vasquez Polk MD;  Location: [facility] OR;  Service: Pain Management;  Laterality: Right;  Burned at 80 degrees C X 60 seconds X 2 each site    RADIOFREQUENCY THERMOCOAGULATION Right 9/20/2019    Procedure: RADIOFREQUENCY THERMAL COAGULATION-CERVICAL;  Surgeon: Vasquez Polk MD;  Location: [facility] OR;  Service: Pain Management;  Laterality: Right;  CERVIAL C 3,4,5,6 -- Burned at 80 degrees C. for 60 seconds each site    TRANSFORAMINAL EPIDURAL INJECTION OF STEROID Right 7/3/2019    Procedure: Injection,steroid,epidural,transforaminal  "approach C3-4;  Surgeon: Vasquez Polk MD;  Location: [facility] OR;  Service: Pain Management;  Laterality: Right;    TRANSFORAMINAL EPIDURAL INJECTION OF STEROID Left 10/29/2020    Procedure: Injection,steroid,epidural,transforaminal approach;  Surgeon: Vasquez Polk MD;  Location: [facility] OR;  Service: Pain Management;  Laterality: Left;  L3-4, L5-S1     History reviewed. No pertinent family history.  Social History     Socioeconomic History    Marital status:      Spouse name: Not on file    Number of children: Not on file    Years of education: Not on file    Highest education level: Not on file   Occupational History    Not on file   Social Needs    Financial resource strain: Not on file    Food insecurity     Worry: Not on file     Inability: Not on file    Transportation needs     Medical: Not on file     Non-medical: Not on file   Tobacco Use    Smoking status: Never Smoker    Smokeless tobacco: Never Used   Substance and Sexual Activity    Alcohol use: Never     Frequency: Never    Drug use: Not on file    Sexual activity: Not on file   Lifestyle    Physical activity     Days per week: Not on file     Minutes per session: Not on file    Stress: Not on file   Relationships    Social connections     Talks on phone: Not on file     Gets together: Not on file     Attends [employer] service: Not on file     Active member of club or organization: Not on file     Attends meetings of clubs or organizations: Not on file     Relationship status: Not on file   Other Topics Concern    Not on file   Social History Narrative    Not on file         Medications/Allergies: See med card    Vitals:    11/19/20 1100   BP: (!) 141/63   Pulse: 85   Weight: 53.1 kg (117 lb)   Height: 4' 11" (1.499 m)   PainSc:   8   PainLoc: Back         Physical exam:    GENERAL: A and O x3, the patient appears well groomed and is in no acute distress.  Skin: No rashes or obvious lesions  HEENT: normocephalic, " atraumatic  CARDIOVASCULAR:  RRR  LUNGS: non labored breathing  ABDOMEN: soft, nontender   UPPER EXTREMITIES: Normal alignment, normal range of motion, no atrophy, no skin changes,  hair growth and nail growth normal and equal bilaterally. No swelling, no tenderness.    LOWER EXTREMITIES:  Normal alignment, normal range of motion, no atrophy, no skin changes,  hair growth and nail growth normal and equal bilaterally. No swelling, no tenderness.  CERVICAL and LUMBAR SPINE:  Cervical spine: ROM is limited in flexion, extension and lateral rotation with moderate increased pain.  Lumbar spine: ROM is limited in flexion, extension and lateral rotation with moderate increased pain.  Spurling's maneuver causes neck pain to right side.  Myofascial exam: No Tenderness to palpation across cervical paraspinous region bilaterally.Severe TTP left lumbar paraspinous region. Muscle atrophy noted.     MENTAL STATUS: normal orientation, speech, language, and fund of knowledge for social situation.  Emotional state appropriate.    CRANIAL NERVES:  II:  PERRL bilaterally,   III,IV,VI: EOMI.    V:  Facial sensation equal bilaterally  VII:  Facial motor function normal.  VIII:  Hearing equal to finger rub bilaterally  IX/X: Gag normal, palate symmetric  XI:  Shoulder shrug equal, head turn equal  XII:  Tongue midline without fasciculations      MOTOR: Tone and bulk: normal bilateral upper and lower Strength: normal   Delt Bi Tri WE WF     R 5 5 5 5 5 5   L 5 5 5 5 5 5     IP ADD ABD Quad TA Gas HAM  R 5 5 5 5 5 5 5  L 5 5 5 5 5 5 5    SENSATION: Light touch and pinprick intact bilaterally  REFLEXES: normal, symmetric, nonbrisk.  Toes down, no clonus. No hoffmans.  GAIT: walker      Imaging:  MRI C-spine 5/2019  There are surgical changes related to previous instrumented multilevel anterior  fusion with plate and screw fixation extending from C4 to C7. The vertebral  bodies are appropriately maintained in height. There is mild  retrolisthesis at  C3-C4 and minimal anterolisthesis at C7-T1. There is mild wedge-shaped  configuration of the T3 vertebral body with Schmorl's node defects observed  within both the superior and inferior endplate. There is no associated marrow  edema.    At C2-C3, shallow disc bulging and osteophytic ridging results in a mild degree  of mass effect upon the thecal sac towards the right of midline without  impingement of the cervical cord. Facet degenerative changes contribute to  minor right foraminal narrowing.    At C3-C4, there is a large extrusion of the disc margin extending from the far  right lateral canal into the proximal right neural foramen. This is  superimposed on broad-based disc bulging and posterior osteophytic ridging.  Focal mass effect upon the right side of the thecal sac contacts and slightly  deforms the cervical cord towards the right of midline. Facet degenerative  changes contribute to severe right foraminal stenosis. There is associated mild  signal alteration within the substance of the cord at this level, likely  reflection of myelomalacia. There is a mild-moderate degree of left foraminal  narrowing.    At C4-C5, shallow disc bulging and osteophytic ridging results in mild mass  effect upon the ventral margin of the thecal sac with asymmetry towards the  right of midline. Facet degenerative changes contribute to moderately severe  degrees of foraminal encroachment.    At C5-C6, posterior marginal osteophyte formation extends from the far right  lateral canal into the right neural foramen contributing to asymmetric mass  effect upon the thecal sac without direct cord impingement. There is severe  right foraminal stenosis. Facet degenerative changes contribute to moderately  severe left foraminal narrowing.    At C6-C7, shallow disc bulging results in mild asymmetric mass effect upon the  thecal sac towards the right of midline. Facet degenerative changes contribute  to moderately severe  right foraminal and moderate left foraminal narrowing.    At C7-T1, there is no significant disc bulging or focal soft disc herniation.  Facet degenerative changes which contributes to mild degrees of foraminal  narrowing.    MRI lumbar spine 10/14/20  1. Chronic L3 inferior endplate compression fracture with 3 mm of  retropulsion.  2. Moderate to severe central canal narrowing at L3-L4.  3. 5 mm T2 hyperintense focus intimately associated with left L3-L4  facet joint, most consistent with a synovial cyst, resulting in  compression of left L4 nerve roots in the lateral recess. Correlation  for left L4 radiculopathy is requested.  4. Grade 1 anterolisthesis of L5 on S1 due to severe bilateral facet  joint osteoarthrosis.  5. Severe left and moderate to severe right L5-S1 neural foramen  narrowing. Correlation for bilateral L5 radiculopathies is requested.  6. Additional lumbar spine degenerative changes as described    Assessment:  Madhuri Manuel is a 75 y.o. female with   1. Other spondylosis, lumbar region    2. DDD (degenerative disc disease), lumbar    3. Myofascial pain          Plan:  1. I have stressed the importance of physical activity and exercise to improve overall health  2. Reviewed pertinent imaging and records with patient  3. Schedule Left MBB at L3, 4, 5. I have explained the risks, benefits, and alternatives of the procedure in detail. The patient voices understanding and all questions have been answered. The patient agrees to proceed as planned. Written Consent obtained.   4. If above is positive, will continue with RFA. If negative she will f/u with neurosurgery

## 2020-12-01 ENCOUNTER — LAB VISIT (OUTPATIENT)
Dept: PRIMARY CARE CLINIC | Facility: CLINIC | Age: 75
End: 2020-12-01
Payer: MEDICARE

## 2020-12-01 DIAGNOSIS — Z01.818 PRE-OP TESTING: ICD-10-CM

## 2020-12-01 PROCEDURE — U0003 INFECTIOUS AGENT DETECTION BY NUCLEIC ACID (DNA OR RNA); SEVERE ACUTE RESPIRATORY SYNDROME CORONAVIRUS 2 (SARS-COV-2) (CORONAVIRUS DISEASE [COVID-19]), AMPLIFIED PROBE TECHNIQUE, MAKING USE OF HIGH THROUGHPUT TECHNOLOGIES AS DESCRIBED BY CMS-2020-01-R: HCPCS

## 2020-12-02 LAB — SARS-COV-2 RNA RESP QL NAA+PROBE: NOT DETECTED

## 2020-12-03 NOTE — DISCHARGE INSTRUCTIONS
Before leaving, please make sure you have all your personal belongings such as glasses, purses, wallets, keys, cell phones, jewelry, jackets etc     NERVE BLOCK       This was a test not a treatment. Please keep pain journal as instructed  Tips for recovery  · You may use an ice pack at your injection site for comfort.  · You may shower this evening.   · Do not use a heating pad or take tub baths or swim for 2 days.  · Take your usual medication for pain if needed.  · Dont drive the day of the procedure.  · Wait until tomorrow to resume any blood thinners (aspirin, Plavix, Coumadin) but you may resume all your other medications today.  When to call your doctor  Call right away if you notice any of the following symptoms:  · Severe pain or headache  · Fever or chills  · Redness or swelling around the injection site   · Difficulty breathing  · Vomiting  · Increasing numbness or weakness in arms or legs  ·

## 2020-12-04 ENCOUNTER — HOSPITAL ENCOUNTER (OUTPATIENT)
Facility: AMBULARY SURGERY CENTER | Age: 75
Discharge: HOME | End: 2020-12-04
Attending: ANESTHESIOLOGY | Admitting: ANESTHESIOLOGY
Payer: MEDICARE

## 2020-12-04 DIAGNOSIS — M47.896 OTHER SPONDYLOSIS, LUMBAR REGION: Primary | ICD-10-CM

## 2020-12-04 LAB — POCT GLUCOSE: 80 MG/DL (ref 70–110)

## 2020-12-04 PROCEDURE — 64494 INJ PARAVERT F JNT L/S 2 LEV: CPT | Performed by: ANESTHESIOLOGY

## 2020-12-04 PROCEDURE — 64494 INJ PARAVERT F JNT L/S 2 LEV: CPT | Mod: LT,,, | Performed by: ANESTHESIOLOGY

## 2020-12-04 PROCEDURE — 64493 INJ PARAVERT F JNT L/S 1 LEV: CPT | Mod: LT,,, | Performed by: ANESTHESIOLOGY

## 2020-12-04 PROCEDURE — 64494 PR INJ DX/THER AGNT PARAVERT FACET JOINT,IMG GUIDE,LUMBAR/SAC, 2ND LEVEL: ICD-10-PCS | Mod: LT,,, | Performed by: ANESTHESIOLOGY

## 2020-12-04 PROCEDURE — 64493 INJ PARAVERT F JNT L/S 1 LEV: CPT | Performed by: ANESTHESIOLOGY

## 2020-12-04 PROCEDURE — 64493 PR INJ DX/THER AGNT PARAVERT FACET JOINT,IMG GUIDE,LUMBAR/SAC,1ST LVL: ICD-10-PCS | Mod: LT,,, | Performed by: ANESTHESIOLOGY

## 2020-12-04 RX ORDER — SODIUM CHLORIDE, SODIUM LACTATE, POTASSIUM CHLORIDE, CALCIUM CHLORIDE 600; 310; 30; 20 MG/100ML; MG/100ML; MG/100ML; MG/100ML
INJECTION, SOLUTION INTRAVENOUS ONCE AS NEEDED
Status: DISCONTINUED | OUTPATIENT
Start: 2020-12-04 | End: 2020-12-04 | Stop reason: HOSPADM

## 2020-12-04 RX ORDER — BUPIVACAINE HYDROCHLORIDE 5 MG/ML
INJECTION, SOLUTION EPIDURAL; INTRACAUDAL
Status: DISCONTINUED | OUTPATIENT
Start: 2020-12-04 | End: 2020-12-04 | Stop reason: HOSPADM

## 2020-12-04 RX ORDER — MIDAZOLAM HYDROCHLORIDE 2 MG/2ML
INJECTION, SOLUTION INTRAMUSCULAR; INTRAVENOUS
Status: DISCONTINUED | OUTPATIENT
Start: 2020-12-04 | End: 2020-12-04 | Stop reason: HOSPADM

## 2020-12-04 NOTE — OP NOTE
PROCEDURE DATE: 12/4/2020    PROCEDURE:  Left L3,4,5 medial branch nerve blocks    DIAGNOSIS:  Other lumbar spondylosis    Post Op diagnosis: Same    PHYSICIAN: Vasquez Polk MD    MEDICATIONS INJECTED: 0.5% bupivicaine, 0.5 ml at each level    SEDATION MEDICATIONS:None    LOCAL ANESTHETIC USED: None    ESTIMATED BLOOD LOSS:  NOne    COMPLICATIONS:  None    TECHNIQUE: A time out was taken to identify the patient, procedure and side of the procedure. The patient was placed in a prone position, then prepped and draped in the usual sterile fashion using ChloraPrep and sterile towels.  The levels were determined under fluoroscopic guidance and then marked.  A 25-gauge 3.5 inch needle was introduced to the anatomic location of the L3,4,5 medial branch nerves on the left side. The above medication was then injected. The patient tolerated the procedure well.     The patient was monitored after the procedure. Patient was given pain diary to record pain levels at home.     If found to have greater than a 50% recovery and so will be scheduled for a radiofrequency ablation of the corresponding nerves.  Patient was given post procedure and discharge instructions to follow at home.  The patient was discharged in a stable condition.

## 2020-12-04 NOTE — DISCHARGE SUMMARY
[facility]  Discharge Note  Short Stay    Procedure(s) (LRB):  Block-nerve-medial branch-lumbar (Left)    OUTCOME: Patient tolerated treatment/procedure well without complication and is now ready for discharge.    DISPOSITION: Home or Self Care    FINAL DIAGNOSIS:  Other spondylosis, lumbar region    FOLLOWUP: In clinic    DISCHARGE INSTRUCTIONS:    Discharge Procedure Orders   Notify your health care provider if you experience any of the following:  temperature >100.4     Notify your health care provider if you experience any of the following:  severe uncontrolled pain     Notify your health care provider if you experience any of the following:  redness, tenderness, or signs of infection (pain, swelling, redness, odor or green/yellow discharge around incision site)     Activity as tolerated

## 2020-12-07 VITALS
HEART RATE: 66 BPM | BODY MASS INDEX: 23.59 KG/M2 | TEMPERATURE: 98 F | WEIGHT: 117 LBS | SYSTOLIC BLOOD PRESSURE: 141 MMHG | DIASTOLIC BLOOD PRESSURE: 66 MMHG | OXYGEN SATURATION: 98 % | RESPIRATION RATE: 16 BRPM | HEIGHT: 59 IN

## 2020-12-08 ENCOUNTER — TELEPHONE (OUTPATIENT)
Dept: PAIN MEDICINE | Facility: CLINIC | Age: 75
End: 2020-12-08

## 2020-12-08 DIAGNOSIS — Z01.818 PRE-OP TESTING: ICD-10-CM

## 2020-12-08 DIAGNOSIS — M47.896 OTHER SPONDYLOSIS, LUMBAR REGION: Primary | ICD-10-CM

## 2020-12-08 NOTE — TELEPHONE ENCOUNTER
Pt states she did receive better than 80% decrease in pain with the MBB. Scheduled RFA for 12/18. Covid test 12/15

## 2020-12-08 NOTE — TELEPHONE ENCOUNTER
----- Message from RobbRebel Cruz sent at 12/8/2020 10:33 AM CST -----  Regarding: Call  Contact: Patient  Type: Patient Call Back    Who called: Patient    What is the request in detail: Her readings are: 13:00 5, 14:00 4, 15:00 3, 16:00 3, 17:00 3, 19:00 2 21:00 2    Can the clinic reply by [facility]? Yes     Would the patient rather a call back or a response via [facility]? Call    Best call back number: [phone] (home)     Additional Information:    Thanks

## 2020-12-15 ENCOUNTER — LAB VISIT (OUTPATIENT)
Dept: PRIMARY CARE CLINIC | Facility: CLINIC | Age: 75
End: 2020-12-15
Payer: MEDICARE

## 2020-12-15 DIAGNOSIS — Z01.818 PRE-OP TESTING: ICD-10-CM

## 2020-12-15 PROCEDURE — U0003 INFECTIOUS AGENT DETECTION BY NUCLEIC ACID (DNA OR RNA); SEVERE ACUTE RESPIRATORY SYNDROME CORONAVIRUS 2 (SARS-COV-2) (CORONAVIRUS DISEASE [COVID-19]), AMPLIFIED PROBE TECHNIQUE, MAKING USE OF HIGH THROUGHPUT TECHNOLOGIES AS DESCRIBED BY CMS-2020-01-R: HCPCS

## 2020-12-16 LAB — SARS-COV-2 RNA RESP QL NAA+PROBE: NOT DETECTED

## 2020-12-18 ENCOUNTER — HOSPITAL ENCOUNTER (OUTPATIENT)
Facility: AMBULARY SURGERY CENTER | Age: 75
Discharge: HOME | End: 2020-12-18
Attending: ANESTHESIOLOGY | Admitting: ANESTHESIOLOGY
Payer: MEDICARE

## 2020-12-18 DIAGNOSIS — M47.896 OTHER SPONDYLOSIS, LUMBAR REGION: Primary | ICD-10-CM

## 2020-12-18 LAB — POCT GLUCOSE: 113 MG/DL (ref 70–110)

## 2020-12-18 PROCEDURE — 64636 DESTROY L/S FACET JNT ADDL: CPT | Mod: RT | Performed by: ANESTHESIOLOGY

## 2020-12-18 PROCEDURE — 64636 DESTROY L/S FACET JNT ADDL: CPT | Mod: 50,,, | Performed by: ANESTHESIOLOGY

## 2020-12-18 PROCEDURE — 64635 DESTROY LUMB/SAC FACET JNT: CPT | Mod: 50,,, | Performed by: ANESTHESIOLOGY

## 2020-12-18 PROCEDURE — 64636 PR DESTROY L/S FACET JNT ADDL: ICD-10-PCS | Mod: 50,,, | Performed by: ANESTHESIOLOGY

## 2020-12-18 PROCEDURE — 64635 DESTROY LUMB/SAC FACET JNT: CPT | Mod: LT | Performed by: ANESTHESIOLOGY

## 2020-12-18 PROCEDURE — 64635 PR DESTROY LUMB/SAC FACET JNT: ICD-10-PCS | Mod: 50,,, | Performed by: ANESTHESIOLOGY

## 2020-12-18 RX ORDER — FENTANYL CITRATE 50 UG/ML
INJECTION, SOLUTION INTRAMUSCULAR; INTRAVENOUS
Status: DISCONTINUED | OUTPATIENT
Start: 2020-12-18 | End: 2020-12-18 | Stop reason: HOSPADM

## 2020-12-18 RX ORDER — LIDOCAINE HYDROCHLORIDE 10 MG/ML
INJECTION, SOLUTION EPIDURAL; INFILTRATION; INTRACAUDAL; PERINEURAL
Status: DISCONTINUED | OUTPATIENT
Start: 2020-12-18 | End: 2020-12-18 | Stop reason: HOSPADM

## 2020-12-18 RX ORDER — SODIUM CHLORIDE, SODIUM LACTATE, POTASSIUM CHLORIDE, CALCIUM CHLORIDE 600; 310; 30; 20 MG/100ML; MG/100ML; MG/100ML; MG/100ML
INJECTION, SOLUTION INTRAVENOUS ONCE AS NEEDED
Status: COMPLETED | OUTPATIENT
Start: 2020-12-18 | End: 2020-12-18

## 2020-12-18 RX ORDER — MIDAZOLAM HYDROCHLORIDE 2 MG/2ML
INJECTION, SOLUTION INTRAMUSCULAR; INTRAVENOUS
Status: DISCONTINUED | OUTPATIENT
Start: 2020-12-18 | End: 2020-12-18 | Stop reason: HOSPADM

## 2020-12-18 RX ORDER — BUPIVACAINE HYDROCHLORIDE 2.5 MG/ML
INJECTION, SOLUTION EPIDURAL; INFILTRATION; INTRACAUDAL
Status: DISCONTINUED | OUTPATIENT
Start: 2020-12-18 | End: 2020-12-18 | Stop reason: HOSPADM

## 2020-12-18 RX ORDER — METHYLPREDNISOLONE ACETATE 80 MG/ML
INJECTION, SUSPENSION INTRA-ARTICULAR; INTRALESIONAL; INTRAMUSCULAR; SOFT TISSUE
Status: DISCONTINUED | OUTPATIENT
Start: 2020-12-18 | End: 2020-12-18 | Stop reason: HOSPADM

## 2020-12-18 RX ORDER — LIDOCAINE HYDROCHLORIDE 20 MG/ML
INJECTION, SOLUTION EPIDURAL; INFILTRATION; INTRACAUDAL; PERINEURAL
Status: DISCONTINUED | OUTPATIENT
Start: 2020-12-18 | End: 2020-12-18 | Stop reason: HOSPADM

## 2020-12-18 RX ADMIN — SODIUM CHLORIDE, SODIUM LACTATE, POTASSIUM CHLORIDE, CALCIUM CHLORIDE: 600; 310; 30; 20 INJECTION, SOLUTION INTRAVENOUS at 12:12

## 2020-12-18 NOTE — OP NOTE
PROCEDURE DATE: 12/18/2020    PROCEDURE:  Radiofrequency ablation of the L3,4,5 medial branch nerves on the bilateral-side utilizing fluoroscopy    DIAGNOSIS:  Other lumbar spondylosis  Post op Diagnosis: Same    PHYSICIAN: Vasquez Polk MD    MEDICATIONS INJECTED:  From a mixture of 6ml of 0.25% bupivicaine and 80mg of methylprednisone,  1ml of this solution was injected at each level.    LOCAL ANESTHETIC USED: Lidocaine 1%, 2 ml given at each site.    SEDATION MEDICATIONS: RN IV sedation    ESTIMATED BLOOD LOSS:  None    COMPLICATIONS:  None    TECHNIQUE:  A time out was taken to identify patient and procedure side prior to starting the procedure. Laying in a prone position, the patient was prepped and draped in the usual sterile fashion using ChloraPrep and sterile towels.  The levels were determined under fluoroscopic guidance and then marked.  Local anesthetic was given by raising a wheal at the skin over each site and then infiltrated approximately 2cm deeper.  A 20-gauge  100 mm RF needle was introduced to the anatomic location of the bilateral L3,4,5 medial branch nerves.  Motor stimulation up to 2 Volts at each level confirmed no motor nerve involvement.  Impedance was less than 800 ohms at each level.  1ml of 2% lidocaine was instilled prior to lesioning.  Ablation was performed per level utilizing radiofrequency generator 80°C for 60 seconds.  Needles were then rotated 90° and a second lesion was performed.  The above noted medication was then injected slowly. The patient tolerated the procedure well.     The patient was monitored after the procedure.  Patient was given post procedure and discharge instructions to follow at home.  The patient was discharged in a stable condition

## 2020-12-18 NOTE — DISCHARGE INSTRUCTIONS
Before leaving, please make sure you have all your personal belongings such as glasses, purses, wallets, keys, cell phones, jewelry, jackets etc     RADIOFREQUENCY/Pain injection instructions:     This procedure may take a several weeks to relieve pain  You may get some pain relief from the local anesthetic initally.   Steroids can have side effects of flushed face or nervous feeling.    No driving for 24 hrs.   Activity as tolerated- gradually increase activities.  Dont lift over 10 lbs for 24 hrs   No heat at injection sites for 2 full days. No heating pads, hot tubs, saunas, or swimming in any body of water or pool for 2 full days.  Use ice pack for mild swelling and for comfort , apply for 20 minutes, remove for 20 minute intervals. No direct contact of ice itself  to skin.  May shower today.  Do not allow shower water to beat on injections site(s) for 2 full days. No tub baths for two full days.      Resume Aspirin, Plavix, or Coumadin the day after the procedure unless otherwise instructed.   If diabetic,monitor your glucose carefully as steroids can increase your glucose level    Seek immediate medical help for:   Severe increase in your usual pain or appearance of new pain.  Prolonged (more than 8 hours) or increasing weakness or numbness in the legs or arms.   .    Fever above 100.4 degrees F ,Drainage,redness,active bleeding, or increased swelling at the injection site.  Headache, shortness of breath, chest pain, or breathing problems.       Radiofrequency of Nerves    After this procedure you may have increased pain for three days and lingering pain for up to 6 weeks.   Most patients feel better after 4-6  weeks.    A steroid may also be injected to help with pain relief.  Steroids can have side effect of flushed face and nervous feeling.  Use your pain medications as needed but the goal of this treartment is to wean you off your pain medication.  You may have weakness after the procedure due to the  numbing injection.  You may feel a sunburn effect and some patients may need a burn cream for the skin, but wait  2 full days after your procedure to apply to injection area.    Use ice pack for discomfort :apply for 20 minutes, remove for 20 minutes for up to 2 days. Do not sleep with ice pack.  Do not use a heating pad or take tub baths or swim for 2 days.  You may shower today  Gradually increase your activities.  Dont lift anything over 10 lbs for the first 24 hours  Dont drive the day of the procedure Wait 24 hrs to drive.  Wait until tomorrow to resume any blood thinners (aspirin, Plavix, Coumadin) but you may resume all your other medications today.  If Diabetic, monitor you glucose carefully due to steroids  used for this procedure    Seek Medical Attention if you have:  Severe pain or headache  Fever or chills  Redness or swelling around the injection site   Difficulty breathing  Vomiting or Increasing numbness or weakness in arms or legs    After Surgery:  Always be aware that any surgery can cause these symptoms:    Pain- Medication can be prescribed for pain to decrease your pain but may not completely take your pain away.  Over the Counter pain medicine my be enough and you can always use Ice and rest to help ease pain.    Bleeding- a little bleeding after a surgery is usually within normal.  If there is a lot of blood you need to notify your MD.  Emergency treatments of bleeding are cold application, elevation of the bleeding site and compression.    Infection- Infection after surgery is NOT a normal occurrence.  Signs of infection are fever, swelling, hot to touch the incision.  If this occurs notify your MD immediately.    Nausea- this can be common after a surgery especially if you have had anesthesia medicine or are taking pain medicine. The steroid the Dr injected can have a side effect of Nausea.  Staying on clear liquids, bland foods, gingerale, or over the counter anti nausea medicines can  help.  If you vomit more than once, notify your MD.  Anti Nausea medicines can be prescribed.

## 2020-12-18 NOTE — PLAN OF CARE
Patient awake alert and able to stand and ambulate at bedside using her rolling walker with nurse standby assist.patient says she is ready to go home. She denies pain nausea weakness or dizziness. Vital signs and injection sites stable.All patient belongings have been returned to patient. Patient's daughter here and says she is ready to take patient home and she is driving the patient home.

## 2020-12-21 VITALS
TEMPERATURE: 98 F | SYSTOLIC BLOOD PRESSURE: 147 MMHG | HEART RATE: 63 BPM | OXYGEN SATURATION: 95 % | DIASTOLIC BLOOD PRESSURE: 65 MMHG | WEIGHT: 117.06 LBS | RESPIRATION RATE: 18 BRPM | BODY MASS INDEX: 23.64 KG/M2

## 2021-01-21 ENCOUNTER — OFFICE VISIT (OUTPATIENT)
Dept: PAIN MEDICINE | Facility: CLINIC | Age: 76
End: 2021-01-21
Payer: MEDICARE

## 2021-01-21 VITALS
WEIGHT: 117 LBS | SYSTOLIC BLOOD PRESSURE: 129 MMHG | HEIGHT: 59 IN | HEART RATE: 72 BPM | BODY MASS INDEX: 23.59 KG/M2 | DIASTOLIC BLOOD PRESSURE: 76 MMHG

## 2021-01-21 DIAGNOSIS — R29.898 SEVERE MUSCLE DECONDITIONING: ICD-10-CM

## 2021-01-21 DIAGNOSIS — M51.36 DDD (DEGENERATIVE DISC DISEASE), LUMBAR: ICD-10-CM

## 2021-01-21 DIAGNOSIS — M79.18 MYOFASCIAL PAIN: ICD-10-CM

## 2021-01-21 DIAGNOSIS — M54.16 LUMBAR RADICULITIS: ICD-10-CM

## 2021-01-21 DIAGNOSIS — M47.896 OTHER SPONDYLOSIS, LUMBAR REGION: Primary | ICD-10-CM

## 2021-01-21 PROCEDURE — 99999 PR PBB SHADOW E&M-EST. PATIENT-LVL IV: ICD-10-PCS | Mod: PBBFAC,,, | Performed by: PHYSICIAN ASSISTANT

## 2021-01-21 PROCEDURE — 99214 OFFICE O/P EST MOD 30 MIN: CPT | Mod: PBBFAC,PN | Performed by: PHYSICIAN ASSISTANT

## 2021-01-21 PROCEDURE — 99213 PR OFFICE/OUTPT VISIT, EST, LEVL III, 20-29 MIN: ICD-10-PCS | Mod: S$PBB,,, | Performed by: PHYSICIAN ASSISTANT

## 2021-01-21 PROCEDURE — 99999 PR PBB SHADOW E&M-EST. PATIENT-LVL IV: CPT | Mod: PBBFAC,,, | Performed by: PHYSICIAN ASSISTANT

## 2021-01-21 PROCEDURE — 99213 OFFICE O/P EST LOW 20 MIN: CPT | Mod: S$PBB,,, | Performed by: PHYSICIAN ASSISTANT

## 2021-01-21 RX ORDER — CYCLOBENZAPRINE HCL 5 MG
TABLET ORAL
COMMUNITY
Start: 2021-01-20 | End: 2021-06-21

## 2021-01-27 ENCOUNTER — OFFICE VISIT (OUTPATIENT)
Dept: ORTHOPEDICS | Facility: CLINIC | Age: 76
End: 2021-01-27
Payer: MEDICARE

## 2021-01-27 VITALS — HEIGHT: 59 IN | BODY MASS INDEX: 23.39 KG/M2 | WEIGHT: 116 LBS

## 2021-01-27 DIAGNOSIS — M51.36 DISC DEGENERATION, LUMBAR: ICD-10-CM

## 2021-01-27 DIAGNOSIS — M71.38 SYNOVIAL CYST OF LUMBAR FACET JOINT: Primary | ICD-10-CM

## 2021-01-27 DIAGNOSIS — M47.816 LUMBAR FACET ARTHROPATHY: ICD-10-CM

## 2021-01-27 DIAGNOSIS — M48.061 LUMBAR FORAMINAL STENOSIS: ICD-10-CM

## 2021-01-27 PROCEDURE — 99213 PR OFFICE/OUTPT VISIT, EST, LEVL III, 20-29 MIN: ICD-10-PCS | Mod: S$GLB,,, | Performed by: ORTHOPAEDIC SURGERY

## 2021-01-27 PROCEDURE — 99213 OFFICE O/P EST LOW 20 MIN: CPT | Mod: S$GLB,,, | Performed by: ORTHOPAEDIC SURGERY

## 2021-01-27 RX ORDER — GABAPENTIN 300 MG/1
CAPSULE ORAL
Qty: 90 CAPSULE | Refills: 2 | Status: SHIPPED | OUTPATIENT
Start: 2021-01-27 | End: 2021-06-20

## 2021-01-27 RX ORDER — LANOLIN ALCOHOL/MO/W.PET/CERES
CREAM (GRAM) TOPICAL
COMMUNITY

## 2021-03-10 ENCOUNTER — OFFICE VISIT (OUTPATIENT)
Dept: ORTHOPEDICS | Facility: CLINIC | Age: 76
End: 2021-03-10
Payer: MEDICARE

## 2021-03-10 VITALS
DIASTOLIC BLOOD PRESSURE: 78 MMHG | HEART RATE: 72 BPM | HEIGHT: 59 IN | BODY MASS INDEX: 24.8 KG/M2 | SYSTOLIC BLOOD PRESSURE: 120 MMHG | WEIGHT: 123 LBS

## 2021-03-10 DIAGNOSIS — M47.816 LUMBAR FACET ARTHROPATHY: ICD-10-CM

## 2021-03-10 DIAGNOSIS — M48.062 LUMBAR STENOSIS WITH NEUROGENIC CLAUDICATION: ICD-10-CM

## 2021-03-10 DIAGNOSIS — M48.061 LUMBAR FORAMINAL STENOSIS: Primary | ICD-10-CM

## 2021-03-10 DIAGNOSIS — M51.36 DISC DEGENERATION, LUMBAR: ICD-10-CM

## 2021-03-10 PROCEDURE — 99213 OFFICE O/P EST LOW 20 MIN: CPT | Mod: S$GLB,,, | Performed by: ORTHOPAEDIC SURGERY

## 2021-03-10 PROCEDURE — 99213 PR OFFICE/OUTPT VISIT, EST, LEVL III, 20-29 MIN: ICD-10-PCS | Mod: S$GLB,,, | Performed by: ORTHOPAEDIC SURGERY

## 2021-03-10 RX ORDER — ESCITALOPRAM OXALATE 20 MG/1
TABLET ORAL
COMMUNITY
Start: 2021-01-27 | End: 2021-07-30

## 2021-03-10 RX ORDER — LIDOCAINE 50 MG/G
PATCH TOPICAL DAILY
Qty: 30 PATCH | Refills: 0 | Status: SHIPPED | OUTPATIENT
Start: 2021-03-10 | End: 2021-04-09

## 2021-03-10 RX ORDER — GABAPENTIN 300 MG/1
300 CAPSULE ORAL 4 TIMES DAILY
Qty: 120 CAPSULE | Refills: 3 | Status: SHIPPED | OUTPATIENT
Start: 2021-03-10 | End: 2021-04-09

## 2021-06-21 ENCOUNTER — OFFICE VISIT (OUTPATIENT)
Dept: ORTHOPEDICS | Facility: CLINIC | Age: 76
End: 2021-06-21
Payer: MEDICARE

## 2021-06-21 DIAGNOSIS — M71.38 SYNOVIAL CYST OF LUMBAR FACET JOINT: ICD-10-CM

## 2021-06-21 DIAGNOSIS — M51.36 DISC DEGENERATION, LUMBAR: ICD-10-CM

## 2021-06-21 DIAGNOSIS — M48.062 LUMBAR STENOSIS WITH NEUROGENIC CLAUDICATION: ICD-10-CM

## 2021-06-21 DIAGNOSIS — M47.816 LUMBAR FACET ARTHROPATHY: ICD-10-CM

## 2021-06-21 DIAGNOSIS — M48.061 LUMBAR FORAMINAL STENOSIS: Primary | ICD-10-CM

## 2021-06-21 PROCEDURE — 99213 OFFICE O/P EST LOW 20 MIN: CPT | Mod: S$GLB,,, | Performed by: ORTHOPAEDIC SURGERY

## 2021-06-21 PROCEDURE — 99213 PR OFFICE/OUTPT VISIT, EST, LEVL III, 20-29 MIN: ICD-10-PCS | Mod: S$GLB,,, | Performed by: ORTHOPAEDIC SURGERY

## 2021-06-21 RX ORDER — METHOCARBAMOL 500 MG/1
TABLET, FILM COATED ORAL
COMMUNITY
Start: 2020-09-17 | End: 2021-06-21

## 2021-06-21 RX ORDER — CYCLOBENZAPRINE HCL 10 MG
10 TABLET ORAL 3 TIMES DAILY PRN
Qty: 90 TABLET | Refills: 2 | Status: SHIPPED | OUTPATIENT
Start: 2021-06-21 | End: 2021-09-19

## 2021-06-21 RX ORDER — GABAPENTIN 300 MG/1
600 CAPSULE ORAL 3 TIMES DAILY
Qty: 180 CAPSULE | Refills: 2 | Status: SHIPPED | OUTPATIENT
Start: 2021-06-21 | End: 2021-09-22

## 2021-06-21 RX ORDER — LIDOCAINE 50 MG/G
PATCH TOPICAL
COMMUNITY
Start: 2020-10-07

## 2021-06-22 ENCOUNTER — TELEPHONE (OUTPATIENT)
Dept: FAMILY MEDICINE | Facility: CLINIC | Age: 76
End: 2021-06-22

## 2021-06-24 ENCOUNTER — OFFICE VISIT (OUTPATIENT)
Dept: PAIN MEDICINE | Facility: CLINIC | Age: 76
End: 2021-06-24
Payer: MEDICARE

## 2021-06-24 VITALS
HEART RATE: 68 BPM | HEIGHT: 59 IN | BODY MASS INDEX: 24.8 KG/M2 | DIASTOLIC BLOOD PRESSURE: 71 MMHG | WEIGHT: 123 LBS | SYSTOLIC BLOOD PRESSURE: 134 MMHG

## 2021-06-24 DIAGNOSIS — M47.816 LUMBAR FACET ARTHROPATHY: ICD-10-CM

## 2021-06-24 DIAGNOSIS — M51.36 DISC DEGENERATION, LUMBAR: ICD-10-CM

## 2021-06-24 DIAGNOSIS — M48.061 LUMBAR FORAMINAL STENOSIS: ICD-10-CM

## 2021-06-24 DIAGNOSIS — M47.896 OTHER SPONDYLOSIS, LUMBAR REGION: Primary | ICD-10-CM

## 2021-06-24 PROCEDURE — 99214 PR OFFICE/OUTPT VISIT, EST, LEVL IV, 30-39 MIN: ICD-10-PCS | Mod: S$PBB,,, | Performed by: ANESTHESIOLOGY

## 2021-06-24 PROCEDURE — 99999 PR PBB SHADOW E&M-EST. PATIENT-LVL V: ICD-10-PCS | Mod: PBBFAC,,, | Performed by: ANESTHESIOLOGY

## 2021-06-24 PROCEDURE — 99215 OFFICE O/P EST HI 40 MIN: CPT | Mod: PBBFAC,PN | Performed by: ANESTHESIOLOGY

## 2021-06-24 PROCEDURE — 99999 PR PBB SHADOW E&M-EST. PATIENT-LVL V: CPT | Mod: PBBFAC,,, | Performed by: ANESTHESIOLOGY

## 2021-06-24 PROCEDURE — 99214 OFFICE O/P EST MOD 30 MIN: CPT | Mod: S$PBB,,, | Performed by: ANESTHESIOLOGY

## 2021-06-24 RX ORDER — NITROFURANTOIN 25; 75 MG/1; MG/1
CAPSULE ORAL
COMMUNITY
Start: 2021-02-04

## 2021-07-07 ENCOUNTER — TELEPHONE (OUTPATIENT)
Dept: PAIN MEDICINE | Facility: CLINIC | Age: 76
End: 2021-07-07

## 2021-07-09 DIAGNOSIS — M47.896 OTHER SPONDYLOSIS, LUMBAR REGION: Primary | ICD-10-CM

## 2021-07-30 DIAGNOSIS — Z01.818 PRE-OP TESTING: ICD-10-CM

## 2021-07-30 RX ORDER — CITALOPRAM 10 MG/1
10 TABLET ORAL DAILY
COMMUNITY

## 2021-07-30 RX ORDER — PROPRANOLOL HYDROCHLORIDE 60 MG/1
60 CAPSULE, EXTENDED RELEASE ORAL DAILY
COMMUNITY

## 2021-07-30 RX ORDER — PANTOPRAZOLE SODIUM 40 MG/1
40 TABLET, DELAYED RELEASE ORAL DAILY
Status: ON HOLD | COMMUNITY
End: 2021-08-03

## 2021-07-31 ENCOUNTER — LAB VISIT (OUTPATIENT)
Dept: PRIMARY CARE CLINIC | Facility: CLINIC | Age: 76
End: 2021-07-31
Payer: MEDICARE

## 2021-07-31 DIAGNOSIS — Z01.818 PRE-OP TESTING: ICD-10-CM

## 2021-07-31 PROCEDURE — U0005 INFEC AGEN DETEC AMPLI PROBE: HCPCS | Performed by: ANESTHESIOLOGY

## 2021-07-31 PROCEDURE — U0003 INFECTIOUS AGENT DETECTION BY NUCLEIC ACID (DNA OR RNA); SEVERE ACUTE RESPIRATORY SYNDROME CORONAVIRUS 2 (SARS-COV-2) (CORONAVIRUS DISEASE [COVID-19]), AMPLIFIED PROBE TECHNIQUE, MAKING USE OF HIGH THROUGHPUT TECHNOLOGIES AS DESCRIBED BY CMS-2020-01-R: HCPCS | Performed by: ANESTHESIOLOGY

## 2021-08-02 LAB
SARS-COV-2 RNA RESP QL NAA+PROBE: NOT DETECTED
SARS-COV-2- CYCLE NUMBER: -1

## 2021-08-03 ENCOUNTER — HOSPITAL ENCOUNTER (OUTPATIENT)
Facility: AMBULARY SURGERY CENTER | Age: 76
Discharge: HOME | End: 2021-08-03
Attending: ANESTHESIOLOGY | Admitting: ANESTHESIOLOGY
Payer: MEDICARE

## 2021-08-03 VITALS
RESPIRATION RATE: 20 BRPM | DIASTOLIC BLOOD PRESSURE: 66 MMHG | OXYGEN SATURATION: 93 % | WEIGHT: 123 LBS | HEIGHT: 59 IN | TEMPERATURE: 98 F | HEART RATE: 60 BPM | SYSTOLIC BLOOD PRESSURE: 142 MMHG | BODY MASS INDEX: 24.8 KG/M2

## 2021-08-03 DIAGNOSIS — M47.896 OTHER SPONDYLOSIS, LUMBAR REGION: Primary | ICD-10-CM

## 2021-08-03 LAB — POCT GLUCOSE: 226 MG/DL (ref 70–110)

## 2021-08-03 PROCEDURE — 64636 PR DESTROY L/S FACET JNT ADDL: ICD-10-PCS | Mod: 50,,, | Performed by: ANESTHESIOLOGY

## 2021-08-03 PROCEDURE — 64636 DESTROY L/S FACET JNT ADDL: CPT | Mod: 50,,, | Performed by: ANESTHESIOLOGY

## 2021-08-03 PROCEDURE — 64635 PR DESTROY LUMB/SAC FACET JNT: ICD-10-PCS | Mod: 50,,, | Performed by: ANESTHESIOLOGY

## 2021-08-03 PROCEDURE — 64635 DESTROY LUMB/SAC FACET JNT: CPT | Mod: 50,,, | Performed by: ANESTHESIOLOGY

## 2021-08-03 PROCEDURE — 64636 DESTROY L/S FACET JNT ADDL: CPT | Mod: LT | Performed by: ANESTHESIOLOGY

## 2021-08-03 PROCEDURE — 64635 DESTROY LUMB/SAC FACET JNT: CPT | Mod: LT | Performed by: ANESTHESIOLOGY

## 2021-08-03 RX ORDER — BUPIVACAINE HYDROCHLORIDE 2.5 MG/ML
INJECTION, SOLUTION EPIDURAL; INFILTRATION; INTRACAUDAL
Status: DISCONTINUED | OUTPATIENT
Start: 2021-08-03 | End: 2021-08-03 | Stop reason: HOSPADM

## 2021-08-03 RX ORDER — LIDOCAINE HYDROCHLORIDE 20 MG/ML
INJECTION, SOLUTION EPIDURAL; INFILTRATION; INTRACAUDAL; PERINEURAL
Status: DISCONTINUED | OUTPATIENT
Start: 2021-08-03 | End: 2021-08-03 | Stop reason: HOSPADM

## 2021-08-03 RX ORDER — LIDOCAINE HYDROCHLORIDE 10 MG/ML
INJECTION, SOLUTION EPIDURAL; INFILTRATION; INTRACAUDAL; PERINEURAL
Status: DISCONTINUED | OUTPATIENT
Start: 2021-08-03 | End: 2021-08-03 | Stop reason: HOSPADM

## 2021-08-03 RX ORDER — METHYLPREDNISOLONE ACETATE 80 MG/ML
INJECTION, SUSPENSION INTRA-ARTICULAR; INTRALESIONAL; INTRAMUSCULAR; SOFT TISSUE
Status: DISCONTINUED | OUTPATIENT
Start: 2021-08-03 | End: 2021-08-03 | Stop reason: HOSPADM

## 2021-08-03 RX ORDER — FENTANYL CITRATE 50 UG/ML
INJECTION, SOLUTION INTRAMUSCULAR; INTRAVENOUS
Status: DISCONTINUED | OUTPATIENT
Start: 2021-08-03 | End: 2021-08-03 | Stop reason: HOSPADM

## 2021-08-03 RX ORDER — MIDAZOLAM HYDROCHLORIDE 2 MG/2ML
INJECTION, SOLUTION INTRAMUSCULAR; INTRAVENOUS
Status: DISCONTINUED | OUTPATIENT
Start: 2021-08-03 | End: 2021-08-03 | Stop reason: HOSPADM

## 2021-08-03 RX ORDER — SODIUM CHLORIDE, SODIUM LACTATE, POTASSIUM CHLORIDE, CALCIUM CHLORIDE 600; 310; 30; 20 MG/100ML; MG/100ML; MG/100ML; MG/100ML
INJECTION, SOLUTION INTRAVENOUS ONCE AS NEEDED
Status: COMPLETED | OUTPATIENT
Start: 2021-08-03 | End: 2021-08-03

## 2021-08-03 RX ORDER — PANTOPRAZOLE SODIUM 20 MG/1
20 TABLET, DELAYED RELEASE ORAL DAILY
COMMUNITY

## 2021-08-03 RX ADMIN — SODIUM CHLORIDE, SODIUM LACTATE, POTASSIUM CHLORIDE, CALCIUM CHLORIDE: 600; 310; 30; 20 INJECTION, SOLUTION INTRAVENOUS at 12:08

## 2021-08-16 ENCOUNTER — OFFICE VISIT (OUTPATIENT)
Dept: ORTHOPEDICS | Facility: CLINIC | Age: 76
End: 2021-08-16
Payer: MEDICARE

## 2021-08-16 VITALS — HEIGHT: 59 IN | BODY MASS INDEX: 24.39 KG/M2 | WEIGHT: 121 LBS

## 2021-08-16 DIAGNOSIS — M51.36 DISC DEGENERATION, LUMBAR: ICD-10-CM

## 2021-08-16 DIAGNOSIS — M47.816 LUMBAR FACET ARTHROPATHY: Primary | ICD-10-CM

## 2021-08-16 DIAGNOSIS — M48.062 LUMBAR STENOSIS WITH NEUROGENIC CLAUDICATION: ICD-10-CM

## 2021-08-16 PROCEDURE — 99213 PR OFFICE/OUTPT VISIT, EST, LEVL III, 20-29 MIN: ICD-10-PCS | Mod: S$GLB,,, | Performed by: ORTHOPAEDIC SURGERY

## 2021-08-16 PROCEDURE — 99213 OFFICE O/P EST LOW 20 MIN: CPT | Mod: S$GLB,,, | Performed by: ORTHOPAEDIC SURGERY

## 2021-08-16 RX ORDER — OMEPRAZOLE 20 MG/1
1 CAPSULE, DELAYED RELEASE ORAL DAILY
COMMUNITY
Start: 2021-01-13 | End: 2022-01-12

## 2021-09-14 ENCOUNTER — OFFICE VISIT (OUTPATIENT)
Dept: PAIN MEDICINE | Facility: CLINIC | Age: 76
End: 2021-09-14
Payer: MEDICARE

## 2021-09-14 VITALS
DIASTOLIC BLOOD PRESSURE: 74 MMHG | HEIGHT: 59 IN | SYSTOLIC BLOOD PRESSURE: 149 MMHG | HEART RATE: 66 BPM | WEIGHT: 121 LBS | BODY MASS INDEX: 24.39 KG/M2

## 2021-09-14 DIAGNOSIS — M48.061 LUMBAR FORAMINAL STENOSIS: ICD-10-CM

## 2021-09-14 DIAGNOSIS — M47.816 LUMBAR FACET ARTHROPATHY: ICD-10-CM

## 2021-09-14 DIAGNOSIS — M51.36 DDD (DEGENERATIVE DISC DISEASE), LUMBAR: ICD-10-CM

## 2021-09-14 DIAGNOSIS — M47.896 OTHER SPONDYLOSIS, LUMBAR REGION: Primary | ICD-10-CM

## 2021-09-14 PROCEDURE — 99999 PR PBB SHADOW E&M-EST. PATIENT-LVL IV: ICD-10-PCS | Mod: PBBFAC,,, | Performed by: PHYSICIAN ASSISTANT

## 2021-09-14 PROCEDURE — 99214 PR OFFICE/OUTPT VISIT, EST, LEVL IV, 30-39 MIN: ICD-10-PCS | Mod: S$PBB,,, | Performed by: PHYSICIAN ASSISTANT

## 2021-09-14 PROCEDURE — 99214 OFFICE O/P EST MOD 30 MIN: CPT | Mod: S$PBB,,, | Performed by: PHYSICIAN ASSISTANT

## 2021-09-14 PROCEDURE — 99214 OFFICE O/P EST MOD 30 MIN: CPT | Mod: PBBFAC,PN | Performed by: PHYSICIAN ASSISTANT

## 2021-09-14 PROCEDURE — 99999 PR PBB SHADOW E&M-EST. PATIENT-LVL IV: CPT | Mod: PBBFAC,,, | Performed by: PHYSICIAN ASSISTANT

## 2021-10-08 DIAGNOSIS — M48.061 LUMBAR FORAMINAL STENOSIS: ICD-10-CM

## 2021-10-08 DIAGNOSIS — M71.38 SYNOVIAL CYST OF LUMBAR FACET JOINT: ICD-10-CM

## 2021-10-08 DIAGNOSIS — M51.36 DISC DEGENERATION, LUMBAR: ICD-10-CM

## 2021-10-08 DIAGNOSIS — M47.816 LUMBAR FACET ARTHROPATHY: ICD-10-CM

## 2021-10-08 RX ORDER — GABAPENTIN 300 MG/1
CAPSULE ORAL
Qty: 180 CAPSULE | Refills: 2 | Status: SHIPPED | OUTPATIENT
Start: 2021-10-08

## 2022-02-04 NOTE — DISCHARGE SUMMARY
[facility]  Discharge Note  Short Stay    Admit Date: 10/23/2019    Discharge Date and Time: 10/23/2019    Attending Physician: Vasquez Polk MD     Discharge Provider: Vasquez Polk    Diagnoses:  Active Hospital Problems    Diagnosis  POA    *Other spondylosis, lumbar region [M47.896]  Yes      Resolved Hospital Problems   No resolved problems to display.       Hospital Course: Lumbar mB RFA  Discharged Condition: Good    Final Diagnoses:   Active Hospital Problems    Diagnosis  POA    *Other spondylosis, lumbar region [M47.896]  Yes      Resolved Hospital Problems   No resolved problems to display.       Disposition: Home or Self Care    Follow up/Patient Instructions:    Medications:  Reconciled Home Medications:      Medication List      CONTINUE taking these medications    aspirin 81 MG Chew  Take 1 tablet by mouth.     atorvastatin 80 MG tablet  Commonly known as:  LIPITOR     CALCITRATE-VITAMIN D 315 mg- 250 unit Tab  Generic drug:  calcium citrate-vitamin D3     cholecalciferol (vitamin D3) 2,000 unit Tab  Commonly known as:  VITAMIN D3     FREESTYLE LITE STRIPS Strp  Generic drug:  blood sugar diagnostic     glipiZIDE 2.5 MG Tr24  Commonly known as:  GLUCOTROL     JANUMET XR 50-1,000 mg Tm24  Generic drug:  SITagliptan-metformin     losartan 25 MG tablet  Commonly known as:  COZAAR     magnesium oxide 400 mg (241.3 mg magnesium) tablet  Commonly known as:  MAG-OX     omeprazole 20 MG capsule  Commonly known as:  PRILOSEC     RECLAST 5 mg/100 mL Pgbk  Generic drug:  zoledronic acid-mannitol & water  Inject 5 mg into the vein.     REFRESH OPTIVE SENSITIVE (PF) 0.5-0.9 % Dpet  Generic drug:  carboxymethylcell-glycerin(PF)     sertraline 100 MG tablet  Commonly known as:  ZOLOFT     traZODone 50 MG tablet  Commonly known as:  DESYREL     VITAMIN B-6 50 MG Tab  Generic drug:  pyridoxine (vitamin B6)          Discharge Procedure Orders   Call MD for:  temperature >100.4     Call MD for:  persistent nausea  and vomiting or diarrhea     Call MD for:  severe uncontrolled pain     Call MD for:  redness, tenderness, or signs of infection (pain, swelling, redness, odor or green/yellow discharge around incision site)     Call MD for:  difficulty breathing or increased cough     Call MD for:  severe persistent headache        Follow up with MD in 2-3 weeks    Discharge Procedure Orders (must include Diet, Follow-up, Activity):   Discharge Procedure Orders (must include Diet, Follow-up, Activity)   Call MD for:  temperature >100.4     Call MD for:  persistent nausea and vomiting or diarrhea     Call MD for:  severe uncontrolled pain     Call MD for:  redness, tenderness, or signs of infection (pain, swelling, redness, odor or green/yellow discharge around incision site)     Call MD for:  difficulty breathing or increased cough     Call MD for:  severe persistent headache         66 y.o. female with preop diagnosis of nontoxic goiter

## (undated) DEVICE — NDL HYPODERMIC BLUNT 18G 1.5IN

## (undated) DEVICE — NDL SAFETY 25G X 1.5 ECLIPSE

## (undated) DEVICE — CHLORAPREP 10.5 ML APPLICATOR

## (undated) DEVICE — SYR DISP LL 5CC

## (undated) DEVICE — PAD GROUNDING DISPER ELECTRODE

## (undated) DEVICE — CANNULA RADIOPAQUE 20G CURVED

## (undated) DEVICE — SHEET DRAPE MEDIUM

## (undated) DEVICE — NDL SPINAL 25GX3.5 SPINOCAN

## (undated) DEVICE — SYS LABEL CORRECT MED

## (undated) DEVICE — SYR 10CC LUER LOCK

## (undated) DEVICE — GLOVE SURG ULTRA TOUCH 7.5

## (undated) DEVICE — NDL SPINAL SPINOCAN 22GX3.5

## (undated) DEVICE — TUBING MINIBORE EXTENSION

## (undated) DEVICE — GLOVE SURG ULTRA TOUCH 6

## (undated) DEVICE — APPLICATOR CHLORAPREP CLR 10.5

## (undated) DEVICE — GLOVE SURGEONS ULTRA TOUCH 6.5